# Patient Record
Sex: MALE | Race: BLACK OR AFRICAN AMERICAN | NOT HISPANIC OR LATINO | Employment: PART TIME | URBAN - METROPOLITAN AREA
[De-identification: names, ages, dates, MRNs, and addresses within clinical notes are randomized per-mention and may not be internally consistent; named-entity substitution may affect disease eponyms.]

---
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
## 2020-02-18 DIAGNOSIS — F90.2 ADHD (ATTENTION DEFICIT HYPERACTIVITY DISORDER), COMBINED TYPE: ICD-10-CM

## 2020-02-18 DIAGNOSIS — Z79.899 ENCOUNTER FOR LONG-TERM (CURRENT) USE OF MEDICATIONS: ICD-10-CM

## 2020-02-18 NOTE — TELEPHONE ENCOUNTER
Received request via: Patient    Was the patient seen in the last year in this department? Yes    Does the patient have an active prescription (recently filled or refills available) for medication(s) requested? No     : 12/11/19 60 for 30 (written 11/27/19)

## 2020-02-19 ENCOUNTER — OFFICE VISIT (OUTPATIENT)
Dept: BEHAVIORAL HEALTH | Facility: CLINIC | Age: 20
End: 2020-02-19
Payer: COMMERCIAL

## 2020-02-19 VITALS
WEIGHT: 157 LBS | SYSTOLIC BLOOD PRESSURE: 124 MMHG | DIASTOLIC BLOOD PRESSURE: 81 MMHG | HEIGHT: 70 IN | BODY MASS INDEX: 22.48 KG/M2 | HEART RATE: 71 BPM

## 2020-02-19 DIAGNOSIS — F84.0 AUTISM SPECTRUM DISORDER: ICD-10-CM

## 2020-02-19 DIAGNOSIS — F90.2 ADHD (ATTENTION DEFICIT HYPERACTIVITY DISORDER), COMBINED TYPE: ICD-10-CM

## 2020-02-19 DIAGNOSIS — F51.04 CHRONIC INSOMNIA: ICD-10-CM

## 2020-02-19 DIAGNOSIS — F32.A DEPRESSIVE DISORDER: ICD-10-CM

## 2020-02-19 PROCEDURE — 99214 OFFICE O/P EST MOD 30 MIN: CPT | Performed by: PSYCHIATRY & NEUROLOGY

## 2020-02-19 RX ORDER — DEXTROAMPHETAMINE SACCHARATE, AMPHETAMINE ASPARTATE, DEXTROAMPHETAMINE SULFATE AND AMPHETAMINE SULFATE 2.5; 2.5; 2.5; 2.5 MG/1; MG/1; MG/1; MG/1
10 TABLET ORAL 2 TIMES DAILY
Qty: 60 TAB | Refills: 0 | Status: SHIPPED | OUTPATIENT
Start: 2020-03-30 | End: 2020-09-08 | Stop reason: SDUPTHER

## 2020-02-19 RX ORDER — DEXTROAMPHETAMINE SACCHARATE, AMPHETAMINE ASPARTATE, DEXTROAMPHETAMINE SULFATE AND AMPHETAMINE SULFATE 2.5; 2.5; 2.5; 2.5 MG/1; MG/1; MG/1; MG/1
10 TABLET ORAL 2 TIMES DAILY
Qty: 60 TAB | Refills: 0 | Status: SHIPPED | OUTPATIENT
Start: 2020-02-19 | End: 2020-03-20

## 2020-02-19 RX ORDER — DEXTROAMPHETAMINE SACCHARATE, AMPHETAMINE ASPARTATE, DEXTROAMPHETAMINE SULFATE AND AMPHETAMINE SULFATE 2.5; 2.5; 2.5; 2.5 MG/1; MG/1; MG/1; MG/1
10 TABLET ORAL 2 TIMES DAILY
Qty: 60 TAB | Refills: 0 | OUTPATIENT
Start: 2020-02-19 | End: 2020-03-20

## 2020-02-19 RX ORDER — QUETIAPINE FUMARATE 100 MG/1
100 TABLET, FILM COATED ORAL
Qty: 90 TAB | Refills: 0 | Status: SHIPPED | OUTPATIENT
Start: 2020-02-19 | End: 2020-04-09

## 2020-02-19 ASSESSMENT — PATIENT HEALTH QUESTIONNAIRE - PHQ9: CLINICAL INTERPRETATION OF PHQ2 SCORE: 0

## 2020-02-19 NOTE — PROGRESS NOTES
PSYCHIATRY FOLLOW-UP NOTE      Chief Complaint   Patient presents with   • Follow-Up     ADHD, depression         History Of Present Illness:  Deni Vences is a 19 y.o. old transgender female with autism spectrum disorder, ADHD, depressive disorder comes in today for follow up, was last seen 3 months ago.  She is doing all right in regards to his ADHD symptoms.  She is taking 5 classes this semester and continues to use anywhere from 1 to 2 tablets of Adderall to help her stay focused and concentrated.  She has been using 1 majority of the days and endorses benefit.  She denies any new psychosocial stressors.  She denies any current mood symptoms.  She talked at length about possibly having schizoaffective disorder and mentioned some on and off symptoms including dissociation, delusions and feeling disconnected from the world.  She feels that he might have been incorrectly diagnosed as bipolar mood disorder in the past.  She also feels sometimes she stays things that she does not want to.  She denies any problems with his sleep and continues to use Seroquel which she feels helps him with his psychotic symptoms as well.  She denies having thoughts of wanting to hurt herself or others.    Social History:   She is currently a full-time student at [employer] and is working on getting her bachelor's degree in philosophy and math.  She lives in the college dorms.  Her mother and younger brother live in [place].  She is currently dating a justin and identifies herself as bisexual.    Substance Use:  Alcohol - Denies   Nicotine - Denies recent use  Illicit drugs - Denies    Past Medication Trials:  Prozac 20, Lithium 450 mg, Trileptal 300 mg. Vyvanse 30 mg, Adderall XR 30 mg    Medications:  Current Outpatient Medications   Medication Sig Dispense Refill   • QUEtiapine (SEROQUEL) 100 MG Tab Take 1 Tab by mouth every bedtime. 90 Tab 0   • estradiol (ESTRACE) 2 MG Tab Take 2 mg by mouth every day.  3   • progesterone  "(PROMETRIUM) 100 MG Cap Take 100 mg by mouth every day.  3   • spironolactone (ALDACTONE) 50 MG Tab Take 50 mg by mouth 2 Times a Day.  3     No current facility-administered medications for this visit.        Review Of Systems:    Constitutional - Negative for fatigue  Respiratory - Negative for shortness of breath, cough  CVS - Negative for chest pain, palpitations  GI - Negative for nausea, vomiting, abdominal pain, diarrhea, constipation  Musculoskeletal - Negative for back pain  Neurological - Negative for headaches  Psychiatric - Negative for impaired focus and concentration, depression, sleep problems    Physical Examination:  Vital signs: /81   Pulse 71   Ht 1.778 m (5' 10\")   Wt 71.2 kg (157 lb)   BMI 22.53 kg/m²     Musculoskeletal: Normal gait. No abnormal movements.     Mental Status Evaluation:   General: Young black transgender female, dressed in casual attire, good grooming and hygiene, in no apparent distress, calm and cooperative, poor eye contact, no psychomotor agitation or retardation  Orientation: Alert and oriented to person, place and time  Recent and remote memory: Grossly intact  Attention span and concentration: Grossly intact  Speech: Spontaneous, normal rate, rhythm and tone  Thought Process: Linear, logical and goal directed  Thought Content: Denies suicidal or homicidal ideations, intent or plan  Perception: Denies auditory or visual hallucinations. No delusions noted  Associations: Intact  Language: Appropriate  Fund of knowledge and vocabulary: Grossly adequate  Mood: \"okay\"  Affect: Euthymic, mood congruent  Insight: Good  Judgment: Good    Depression screening:  Depression Screen (PHQ-2/PHQ-9) 10/4/2018 1/18/2019 2/19/2020   PHQ-2 Total Score 0 1 0   PHQ-9 Total Score - 5 -     Interpretation of PHQ-9 Total Score   Score Severity   1-4 No Depression   5-9 Mild Depression   10-14 Moderate Depression   15-19 Moderately Severe Depression   20-27 Severe Depression    Medical " Records/Labs/Diagnostic Tests Reviewed:  [place]  records - appropriate refills, no abuse suspected       Impression:  1.  ADHD, combined type - stable  2.  Depressive disorder, unspecified type - stable  3.  Autism spectrum disorder - stable  4.  Chronic insomnia - stable    Plan:  1.  Continue Adderall IR 10 mg twice daily for ADHD.  Provided her with 2 prescriptions for 60 tablets each and again encouraged her to drop both the prescriptions at the pharmacy at the same time so that they do not get .  2.  Continue Seroquel 100 mg at bedtime for mood stabilization and sleep  - AIMS 0 (today)              - Metabolic monitoring: lipid profile with low total cholesterol (10/2017), A1c normal at 5.4 (3/2018)  - Repeat lipid profile and A1c ordered yesterday, reminded her to get the blood work done as soon as possible  3.  Continue to monitor depression without antidepressant medications at this time  4.  Discussed her concerns and it appears that likely manifestation of her underlying autism spectrum disorder rather than schizoaffective disorder    Return to clinic in 3 months or sooner if symptoms worsen    The proposed treatment plan was discussed with the patient who was provided the opportunity to ask questions and make suggestions regarding alternative treatment. Patient verbalized understanding and expressed agreement with the plan.     Haylee Champagne M.D.  20    This note was created using voice recognition software (Dragon). The accuracy of the dictation is limited by the abilities of the software. I have reviewed the note prior to signing, however some errors in grammar and context are still possible. If you have any questions related to this note please do not hesitate to contact our office.

## 2020-03-12 ENCOUNTER — APPOINTMENT (OUTPATIENT)
Dept: BEHAVIORAL HEALTH | Facility: CLINIC | Age: 20
End: 2020-03-12
Payer: COMMERCIAL

## 2020-04-22 ENCOUNTER — TELEMEDICINE (OUTPATIENT)
Dept: BEHAVIORAL HEALTH | Facility: CLINIC | Age: 20
End: 2020-04-22
Payer: COMMERCIAL

## 2020-04-22 VITALS — WEIGHT: 158 LBS | HEIGHT: 71 IN | BODY MASS INDEX: 22.12 KG/M2

## 2020-04-22 DIAGNOSIS — F84.0 AUTISM SPECTRUM DISORDER: ICD-10-CM

## 2020-04-22 DIAGNOSIS — F32.A DEPRESSIVE DISORDER: ICD-10-CM

## 2020-04-22 DIAGNOSIS — F90.2 ADHD (ATTENTION DEFICIT HYPERACTIVITY DISORDER), COMBINED TYPE: ICD-10-CM

## 2020-04-22 DIAGNOSIS — F51.04 CHRONIC INSOMNIA: ICD-10-CM

## 2020-04-22 PROCEDURE — 99214 OFFICE O/P EST MOD 30 MIN: CPT | Mod: 95,CR | Performed by: PSYCHIATRY & NEUROLOGY

## 2020-04-22 RX ORDER — QUETIAPINE FUMARATE 200 MG/1
200 TABLET, FILM COATED ORAL
Qty: 90 TAB | Refills: 0 | Status: SHIPPED | OUTPATIENT
Start: 2020-04-22 | End: 2020-07-28

## 2020-04-22 NOTE — PROGRESS NOTES
"PSYCHIATRY TELEMEDICINE FOLLOW-UP NOTE      Chief Complaint   Patient presents with   • Other     I feel like my dose of Seroquel is low     This encounter was conducted via Zoom .   Verbal consent was obtained. Patient's identity was verified.    History Of Present Illness:  Deni Vences is a 19 y.o. old transgender female with autism spectrum disorder, ADHD, depressive disorder comes in today for follow up, was last seen 2 months ago.  She has been struggling with some irritability and depression on and off lately.  She is compliant with Seroquel but was looking at the dose online and feels that she is on on the lower dose.  She endorses more \"emotional volatility\" and irritability mainly in social settings.  She had to move back home as the domes were closed because of coronavirus and is currently taking classes online.  She denies any relationship stressors at home and continues to be in touch with her boyfriend.  She has been doing all right in regards to her ADHD symptoms with Adderall.  She denies any problems with her appetite and sleep can sometimes be an issue but feels that Seroquel is helpful.  She denies having thoughts of wanting to hurt herself or others.    Social History:   She is currently a full-time student at [employer] and is getting her bachelor's degree in philosophy and math.  She has moved back home with her mother and younger brother live in [place].  She is currently dating a justin and identifies herself as bisexual.    Substance Use:  Alcohol - Denies   Nicotine - Denies   Illicit drugs - Denies    Past Medication Trials:  Prozac 20, Lithium 450 mg, Trileptal 300 mg, Vyvanse 30 mg, Adderall XR 30 mg    Medications:  Current Outpatient Medications   Medication Sig Dispense Refill   • QUEtiapine (SEROQUEL) 100 MG Tab Take 1 Tab by mouth every bedtime. 90 Tab 0   • amphetamine-dextroamphetamine (ADDERALL) 10 MG Tab Take 1 Tab by mouth 2 times a day for 30 days. 60 Tab 0   • estradiol " "(ESTRACE) 2 MG Tab Take 2 mg by mouth every day.  3   • progesterone (PROMETRIUM) 100 MG Cap Take 100 mg by mouth every day.  3   • spironolactone (ALDACTONE) 50 MG Tab Take 50 mg by mouth 2 Times a Day.  3     No current facility-administered medications for this visit.      Review Of Systems:    Constitutional - Negative for fatigue  Respiratory - Negative for shortness of breath, cough  CVS - Negative for chest pain, palpitations  GI - Negative for nausea, vomiting, abdominal pain, diarrhea, constipation  Musculoskeletal - Negative for back pain  Neurological - Negative for headaches  Psychiatric - Positive for depression, irritability     Physical Examination:  Vital signs: Ht 1.803 m (5' 11\")   Wt 71.7 kg (158 lb)   BMI 22.04 kg/m²     Musculoskeletal: No abnormal movements.     Mental Status Evaluation:   General: Young black transgender female, dressed in casual attire, good grooming and hygiene, in no apparent distress, calm and cooperative, poor eye contact, no psychomotor agitation or retardation  Orientation: Alert and oriented to person, place and time  Recent and remote memory: Grossly intact  Attention span and concentration: Grossly intact  Speech: Spontaneous, normal rate, rhythm and tone  Thought Process: Linear, logical and goal directed  Thought Content: Denies suicidal or homicidal ideations, intent or plan  Perception: Denies auditory or visual hallucinations. No delusions noted  Associations: Intact  Language: Appropriate  Fund of knowledge and vocabulary: Grossly adequate  Mood: \"fine\"  Affect: Euthymic, mood congruent  Insight: Good  Judgment: Good    Depression screening:  Depression Screen (PHQ-2/PHQ-9) 10/4/2018 1/18/2019 2/19/2020   PHQ-2 Total Score 0 1 0   PHQ-9 Total Score - 5 -     Interpretation of PHQ-9 Total Score   Score Severity   1-4 No Depression   5-9 Mild Depression   10-14 Moderate Depression   15-19 Moderately Severe Depression   20-27 Severe Depression    Medical " Records/Labs/Diagnostic Tests Reviewed:  [place]  records - appropriate refills, no abuse suspected       Impression:  1.  ADHD, combined type - stable  2.  Depressive disorder, unspecified type - worsening   3.  Autism spectrum disorder - stable  4.  Chronic insomnia - stable    Plan:  1.  Continue Adderall IR 10 mg twice daily for ADHD  2.  Increase Seroquel to 200 mg at bedtime for mood stabilization, depression and sleep  - AIMS 0 (2/2020)              - Metabolic monitoring: lipid profile with low total cholesterol (10/2017), A1c normal at 5.4 (3/2018)  - Repeat lipid profile and A1c ordered at last appointment, reminded him to get the labs done prior to his next appointment  3.  Continue to monitor depression without SSRI/SNRI medications at this time, Seroquel has been beneficial for mood symptoms.    Return to clinic in 6 weeks or sooner if symptoms worsen    The proposed treatment plan was discussed with the patient who was provided the opportunity to ask questions and make suggestions regarding alternative treatment. Patient verbalized understanding and expressed agreement with the plan.     Haylee Champagne M.D.  04/22/20    This note was created using voice recognition software (Dragon). The accuracy of the dictation is limited by the abilities of the software. I have reviewed the note prior to signing, however some errors in grammar and context are still possible. If you have any questions related to this note please do not hesitate to contact our office.

## 2020-06-03 ENCOUNTER — TELEMEDICINE (OUTPATIENT)
Dept: BEHAVIORAL HEALTH | Facility: CLINIC | Age: 20
End: 2020-06-03
Payer: COMMERCIAL

## 2020-06-03 VITALS — HEIGHT: 71 IN | BODY MASS INDEX: 23.38 KG/M2 | WEIGHT: 167 LBS

## 2020-06-03 DIAGNOSIS — F51.04 CHRONIC INSOMNIA: ICD-10-CM

## 2020-06-03 DIAGNOSIS — F32.A DEPRESSIVE DISORDER: ICD-10-CM

## 2020-06-03 DIAGNOSIS — F84.0 AUTISM SPECTRUM DISORDER: ICD-10-CM

## 2020-06-03 DIAGNOSIS — F90.2 ADHD (ATTENTION DEFICIT HYPERACTIVITY DISORDER), COMBINED TYPE: ICD-10-CM

## 2020-06-03 PROCEDURE — 99214 OFFICE O/P EST MOD 30 MIN: CPT | Mod: 95,CR | Performed by: PSYCHIATRY & NEUROLOGY

## 2020-06-03 NOTE — PROGRESS NOTES
PSYCHIATRY TELEMEDICINE FOLLOW-UP NOTE      Chief Complaint   Patient presents with   • Follow-Up     depression, ADHD       This encounter was conducted via Zoom .   Verbal consent was obtained. Patient's identity was verified.    History Of Present Illness:  Deni Vences is a 20 y.o. old transgender female with autism spectrum disorder, ADHD, depressive disorder comes in today for follow up, was last seen 6 weeks ago.  She has been doing better in regards to his mood symptoms with the higher dose of Seroquel.  She is having some stressors at home as her mother has asked her to move out in the near future.  She plans on moving to [place] by next year and does not want him to move along.  She is partially supportive in regards to her transgender status.  Her father is not supportive as well.  She is currently in a relationship and has received support from her boyfriend and plans on moving in with him and his family this summer.  She denies any reckless or impulsive behaviors.  She has been doing all right in regards to his sleep and has gained some weight.  She has been doing all right in regards to his ADHD symptoms as well with Adderall.  She normally takes less Adderall when she is out of school.  She denies having thoughts wanting to hurt himself or others.    Social History:   She is currently a full-time student at [employer] and is getting her bachelor's degree in philosophy and math.  She has moved back home with her mother and younger brother live in [place].  She identifies herself as bisexual and is currently in a relationship with her boyfriend.    Substance Use:  Alcohol - Denies   Nicotine - Denies   Illicit drugs - Denies    Past Medication Trials:  Prozac 20, Lithium 450 mg, Trileptal 300 mg, Vyvanse 30 mg, Adderall XR 30 mg    Medications:  Current Outpatient Medications   Medication Sig Dispense Refill   • QUEtiapine (SEROQUEL) 200 MG Tab Take 1 Tab by mouth every bedtime. 90 Tab 0   •  "estradiol (ESTRACE) 2 MG Tab Take 2 mg by mouth every day.  3   • progesterone (PROMETRIUM) 100 MG Cap Take 100 mg by mouth every day.  3   • spironolactone (ALDACTONE) 50 MG Tab Take 50 mg by mouth 2 Times a Day.  3     No current facility-administered medications for this visit.      Review Of Systems:    Constitutional - Negative for fatigue  Respiratory - Negative for shortness of breath, cough  CVS - Negative for chest pain, palpitations  GI - Negative for nausea, vomiting, abdominal pain, diarrhea, constipation  Musculoskeletal - Negative for back pain  Neurological - Negative for headaches  Psychiatric - Positive for depression    Physical Examination:  Vital signs: Ht 1.803 m (5' 11\")   Wt 75.8 kg (167 lb)   BMI 23.29 kg/m²     Musculoskeletal: No abnormal movements.     Mental Status Evaluation:   General: Young black transgender female, dressed in casual attire, good grooming and hygiene, in no apparent distress, calm and cooperative, poor eye contact, no psychomotor agitation or retardation  Orientation: Alert and oriented to person, place and time  Recent and remote memory: Grossly intact  Attention span and concentration: Grossly intact  Speech: Spontaneous, normal rate, rhythm and tone  Thought Process: Linear, logical and goal directed  Thought Content: Denies suicidal or homicidal ideations, intent or plan  Perception: Denies auditory or visual hallucinations. No delusions noted  Associations: Intact  Language: Appropriate  Fund of knowledge and vocabulary: Grossly adequate  Mood: \"fine\"  Affect: Euthymic, mood congruent  Insight: Good  Judgment: Good    Depression screening:  Depression Screen (PHQ-2/PHQ-9) 10/4/2018 1/18/2019 2/19/2020   PHQ-2 Total Score 0 1 0   PHQ-9 Total Score - 5 -     Interpretation of PHQ-9 Total Score   Score Severity   1-4 No Depression   5-9 Mild Depression   10-14 Moderate Depression   15-19 Moderately Severe Depression   20-27 Severe Depression    Medical " Records/Labs/Diagnostic Tests Reviewed:  [place]  records - appropriate refills, no abuse suspected       Impression:  1.  ADHD, combined type - stable  2.  Depressive disorder, unspecified type - improving   3.  Autism spectrum disorder - stable  4.  Chronic insomnia - stable    Plan:  1.  Continue Adderall IR 10 mg twice daily for ADHD.  Not refilled today.  He should have a prescription on hold at the pharmacy and was advised to pick it up and to contact me through [facility] if he needs a refill before his next appointment.  2.  Continue Seroquel 200 mg at bedtime for mood stabilization, depression and sleep  - AIMS 0 (2/2020)              - Metabolic monitoring: lipid profile with low total cholesterol (10/2017), A1c normal at 5.4 (3/2018)  - Repeat lipid profile and A1c ordered previously, reminded him to get the labs done in order for me to continue prescribing Seroquel   3.  Continue to monitor depression without SSRI/SNRI medications at this time, Seroquel has been beneficial for mood symptoms.    Return to clinic in 3 months or sooner if symptoms worsen    The proposed treatment plan was discussed with the patient who was provided the opportunity to ask questions and make suggestions regarding alternative treatment. Patient verbalized understanding and expressed agreement with the plan.     Haylee Champagne M.D.  06/03/20    This note was created using voice recognition software (Dragon). The accuracy of the dictation is limited by the abilities of the software. I have reviewed the note prior to signing, however some errors in grammar and context are still possible. If you have any questions related to this note please do not hesitate to contact our office.

## 2020-06-12 ENCOUNTER — TELEPHONE (OUTPATIENT)
Dept: PEDIATRICS | Facility: PHYSICIAN GROUP | Age: 20
End: 2020-06-12

## 2020-06-12 NOTE — TELEPHONE ENCOUNTER
From: Deni Vences  To: Office of JUAN DAVID Tobias  Sent: 6/12/2020 12:48 AM PDT  Subject: Medication Renewal Request    Deni Vences would like a refill of the following medications:   Other - Adderall, 10MG Tabs    Preferred pharmacy: [id number] - [place], [place] - [address] AT [facility]

## 2020-06-15 NOTE — TELEPHONE ENCOUNTER
I called and left message letting her know to call her psychiatrist for the refill and that we will be removing Joselin as her PCP as she is now 20 years old, and if she has any questions to give me a call back.

## 2020-06-15 NOTE — TELEPHONE ENCOUNTER
I have not seen Sylmar is over 2 years, he is being followed by psychiatry for this so they have to reach out to them. Also, please update the PCP and remove my name from chart. thanks

## 2020-07-28 RX ORDER — QUETIAPINE FUMARATE 200 MG/1
TABLET, FILM COATED ORAL
Qty: 90 TAB | Refills: 0 | Status: SHIPPED | OUTPATIENT
Start: 2020-07-28 | End: 2020-11-02 | Stop reason: SDUPTHER

## 2020-09-08 DIAGNOSIS — F90.2 ADHD (ATTENTION DEFICIT HYPERACTIVITY DISORDER), COMBINED TYPE: ICD-10-CM

## 2020-09-08 RX ORDER — DEXTROAMPHETAMINE SACCHARATE, AMPHETAMINE ASPARTATE, DEXTROAMPHETAMINE SULFATE AND AMPHETAMINE SULFATE 2.5; 2.5; 2.5; 2.5 MG/1; MG/1; MG/1; MG/1
10 TABLET ORAL 2 TIMES DAILY
Qty: 60 TAB | Refills: 0 | Status: SHIPPED | OUTPATIENT
Start: 2020-09-08 | End: 2020-10-08

## 2020-11-02 ENCOUNTER — TELEPHONE (OUTPATIENT)
Dept: PEDIATRICS | Facility: PHYSICIAN GROUP | Age: 20
End: 2020-11-02

## 2020-11-02 RX ORDER — QUETIAPINE FUMARATE 200 MG/1
200 TABLET, FILM COATED ORAL
Qty: 30 TAB | Refills: 0 | Status: SHIPPED | OUTPATIENT
Start: 2020-11-02 | End: 2020-11-19 | Stop reason: SDUPTHER

## 2020-11-02 NOTE — TELEPHONE ENCOUNTER
Please forward this message/medication request to the right PCP. She is 20 years old and has been following up with a new PCP for quiet sometime. Please remove me as PCP. Thanks.

## 2020-11-09 ENCOUNTER — HOSPITAL ENCOUNTER (OUTPATIENT)
Dept: LAB | Facility: MEDICAL CENTER | Age: 20
End: 2020-11-09
Attending: PSYCHIATRY & NEUROLOGY
Payer: COMMERCIAL

## 2020-11-09 DIAGNOSIS — Z79.899 ENCOUNTER FOR LONG-TERM (CURRENT) USE OF MEDICATIONS: ICD-10-CM

## 2020-11-09 LAB
CHOLEST SERPL-MCNC: 128 MG/DL (ref 100–199)
EST. AVERAGE GLUCOSE BLD GHB EST-MCNC: 111 MG/DL
HBA1C MFR BLD: 5.5 % (ref 0–5.6)
HDLC SERPL-MCNC: 70 MG/DL
LDLC SERPL CALC-MCNC: 44 MG/DL
TRIGL SERPL-MCNC: 70 MG/DL (ref 0–149)

## 2020-11-09 PROCEDURE — 36415 COLL VENOUS BLD VENIPUNCTURE: CPT

## 2020-11-09 PROCEDURE — 83036 HEMOGLOBIN GLYCOSYLATED A1C: CPT

## 2020-11-09 PROCEDURE — 80061 LIPID PANEL: CPT

## 2020-11-19 ENCOUNTER — TELEMEDICINE (OUTPATIENT)
Dept: BEHAVIORAL HEALTH | Facility: CLINIC | Age: 20
End: 2020-11-19
Payer: COMMERCIAL

## 2020-11-19 VITALS — WEIGHT: 176 LBS | HEIGHT: 71 IN | BODY MASS INDEX: 24.64 KG/M2

## 2020-11-19 DIAGNOSIS — F51.04 CHRONIC INSOMNIA: ICD-10-CM

## 2020-11-19 DIAGNOSIS — F84.0 AUTISM SPECTRUM DISORDER: ICD-10-CM

## 2020-11-19 DIAGNOSIS — F32.A DEPRESSIVE DISORDER: ICD-10-CM

## 2020-11-19 DIAGNOSIS — F90.2 ADHD (ATTENTION DEFICIT HYPERACTIVITY DISORDER), COMBINED TYPE: ICD-10-CM

## 2020-11-19 PROCEDURE — 99214 OFFICE O/P EST MOD 30 MIN: CPT | Mod: 95,CR | Performed by: PSYCHIATRY & NEUROLOGY

## 2020-11-19 RX ORDER — DEXTROAMPHETAMINE SACCHARATE, AMPHETAMINE ASPARTATE, DEXTROAMPHETAMINE SULFATE AND AMPHETAMINE SULFATE 2.5; 2.5; 2.5; 2.5 MG/1; MG/1; MG/1; MG/1
10 TABLET ORAL EVERY MORNING
Qty: 30 TAB | Refills: 0 | Status: SHIPPED | OUTPATIENT
Start: 2020-12-18 | End: 2020-12-09 | Stop reason: DRUGHIGH

## 2020-11-19 RX ORDER — DEXTROAMPHETAMINE SACCHARATE, AMPHETAMINE ASPARTATE, DEXTROAMPHETAMINE SULFATE AND AMPHETAMINE SULFATE 2.5; 2.5; 2.5; 2.5 MG/1; MG/1; MG/1; MG/1
10 TABLET ORAL EVERY MORNING
Qty: 30 TAB | Refills: 0 | Status: SHIPPED | OUTPATIENT
Start: 2021-01-16 | End: 2020-12-09 | Stop reason: DRUGHIGH

## 2020-11-19 RX ORDER — QUETIAPINE FUMARATE 200 MG/1
200 TABLET, FILM COATED ORAL
Qty: 90 TAB | Refills: 0 | Status: SHIPPED | OUTPATIENT
Start: 2020-11-19 | End: 2021-03-01 | Stop reason: SDUPTHER

## 2020-11-19 RX ORDER — ESTRADIOL VALERATE 20 MG/ML
INJECTION INTRAMUSCULAR
COMMUNITY
Start: 2020-11-03 | End: 2021-03-30

## 2020-11-19 RX ORDER — DEXTROAMPHETAMINE SACCHARATE, AMPHETAMINE ASPARTATE, DEXTROAMPHETAMINE SULFATE AND AMPHETAMINE SULFATE 2.5; 2.5; 2.5; 2.5 MG/1; MG/1; MG/1; MG/1
10 TABLET ORAL EVERY MORNING
Qty: 30 TAB | Refills: 0 | Status: SHIPPED | OUTPATIENT
Start: 2020-11-19 | End: 2020-12-09 | Stop reason: DRUGHIGH

## 2020-11-19 NOTE — PROGRESS NOTES
PSYCHIATRY TELEMEDICINE FOLLOW-UP NOTE      Chief Complaint   Patient presents with   • Follow-Up     ADHD, depression       This evaluation was conducted via Zoom using secure and encrypted videoconferencing technology. The patient was in a private location in the state of [place].    The patient's identity was confirmed and verbal consent was obtained for this virtual visit.    History Of Present Illness:  Deni Vences is a 20 y.o. transgender female with autism spectrum disorder, ADHD, depressive disorder comes in today for follow up, was last seen over 5 months ago.  She has been doing all right in regards to her mental health since his last visit with me.  She broke up with her boyfriend and is doing all right with that decision.  She she is still at home as her mother decided that she is not moving to [place] anymore.  She is planning on getting a vocal cord surgery to start her transition process and has found a surgeon in [place] who can do the surgery.  She is currently on her mother's insurance and she does not think that her mother will let her do any kind of her any transition surgery.  She is thinking about applying for Medicaid to see if she can get it approved through Medicaid.  Her father lives in [place] and even though he is not supportive she might be able to live with him if the surgery is approved and she has to travel there.  She is doing all right in school and is applying for grad schools at [employer] and outside [place].  She has been doing all right in regards to her sleep and appetite.  She continues to use Seroquel on a consistent basis and had a 2 night period where she did not take Seroquel and had a hard time with sleeping.  He denies any reckless or impulsive behaviors during those 2 nights.  She has also been working part-time and it has been going well for her.  She has been taking Adderall only once daily and very infrequently she takes the second dose.  She is doing all right with the  "once daily dosing and is denying any side effects.  She denies having thoughts wanting to hurt herself or others.    Social History:   She is single, recently broke up boyfriend, identifies as bisexual, lives with mother and younger brother in [place], father lives in [place], full-time student at [employer], getting her bachelor's degree in philosophy and math, working part-time at a [employer] place in [place].    Substance Use:  Alcohol - Denies   Nicotine - Denies   Cannabis - Denies  Illicit drugs - Denies    Past Medication Trials:  Prozac 20 mg, Lithium 450 mg, Trileptal 300 mg, Vyvanse 30 mg, Adderall XR 30 mg    Medications:  Current Outpatient Medications   Medication Sig Dispense Refill   • Estradiol Valerate 20 MG/ML Oil ADM 0.5 ML IM 1 TIME A WK     • QUEtiapine (SEROQUEL) 200 MG Tab Take 1 Tab by mouth every bedtime. 30 Tab 0   • estradiol (ESTRACE) 2 MG Tab Take 2 mg by mouth every day.  3   • progesterone (PROMETRIUM) 100 MG Cap Take 100 mg by mouth every day.  3   • spironolactone (ALDACTONE) 50 MG Tab Take 50 mg by mouth 2 Times a Day.  3     No current facility-administered medications for this visit.      Review Of Systems:    Constitutional - Negative for fatigue  Respiratory - Negative for shortness of breath, cough  CVS - Negative for chest pain, palpitations  GI - Negative for nausea, vomiting, abdominal pain, diarrhea, constipation  Musculoskeletal - Negative for back pain  Neurological - Negative for headaches  Psychiatric - Negative for depression, impaired concentration, sleep problems    Physical Examination:  Vital signs: Ht 1.803 m (5' 11\")   Wt 79.8 kg (176 lb)   BMI 24.55 kg/m²     Musculoskeletal: No abnormal movements.     Mental Status Evaluation:   General: Young black female, dressed in casual attire, good grooming and hygiene, in no apparent distress, calm and cooperative, poor eye contact, no psychomotor agitation or retardation  Orientation: Alert and oriented to " "person, place and time  Recent and remote memory: Grossly intact  Attention span and concentration: Grossly intact  Speech: Spontaneous, normal rate, rhythm and tone  Thought Process: Linear, logical and goal directed  Thought Content: Denies suicidal or homicidal ideations, intent or plan  Perception: Denies auditory or visual hallucinations. No delusions noted  Associations: Intact  Language: Appropriate  Fund of knowledge and vocabulary: Grossly adequate  Mood: \"alright\"  Affect: Euthymic, mood congruent  Insight: Good  Judgment: Good    Depression screening:  Depression Screen (PHQ-2/PHQ-9) 10/4/2018 1/18/2019 2/19/2020   PHQ-2 Total Score 0 1 0   PHQ-9 Total Score - 5 -     Interpretation of PHQ-9 Total Score   Score Severity   1-4 No Depression   5-9 Mild Depression   10-14 Moderate Depression   15-19 Moderately Severe Depression   20-27 Severe Depression    Medical Records/Labs/Diagnostic Tests Reviewed:  [place] [facility] records - appropriate refills, no abuse suspected       Impression:  1.  ADHD, combined type - stable  2.  Depressive disorder, unspecified type - stable  3.  Autism spectrum disorder - stable  4.  Chronic insomnia - stable    Plan:  1.  Decrease Adderall IR 10 mg to daily for ADHD.  E-prescribed x 3 months.  2.  Continue Seroquel 200 mg at bedtime for mood stabilization, depression and sleep  - AIMS 0 (2/2020)              - Metabolic monitoring (11/2020): lipid profile normal, A1c normal  - Repeat metabolic monitoring labs due in 11/2021    Return to clinic in 3 months or sooner if symptoms worsen    The proposed treatment plan was discussed with the patient who was provided the opportunity to ask questions and make suggestions regarding alternative treatment. Patient verbalized understanding and expressed agreement with the plan.     Haylee Champagne M.D.  11/19/20    This note was created using voice recognition software (Dragon). The accuracy of the dictation is limited by the abilities of the " software. I have reviewed the note prior to signing, however some errors in grammar and context are still possible. If you have any questions related to this note please do not hesitate to contact our office.

## 2020-12-09 DIAGNOSIS — F90.2 ADHD (ATTENTION DEFICIT HYPERACTIVITY DISORDER), COMBINED TYPE: ICD-10-CM

## 2020-12-09 RX ORDER — DEXTROAMPHETAMINE SACCHARATE, AMPHETAMINE ASPARTATE, DEXTROAMPHETAMINE SULFATE AND AMPHETAMINE SULFATE 2.5; 2.5; 2.5; 2.5 MG/1; MG/1; MG/1; MG/1
10 TABLET ORAL 2 TIMES DAILY
Qty: 60 TAB | Refills: 0 | Status: SHIPPED | OUTPATIENT
Start: 2021-01-08 | End: 2020-12-15 | Stop reason: SDUPTHER

## 2020-12-09 RX ORDER — DEXTROAMPHETAMINE SACCHARATE, AMPHETAMINE ASPARTATE, DEXTROAMPHETAMINE SULFATE AND AMPHETAMINE SULFATE 2.5; 2.5; 2.5; 2.5 MG/1; MG/1; MG/1; MG/1
10 TABLET ORAL 2 TIMES DAILY
Qty: 60 TAB | Refills: 0 | Status: SHIPPED | OUTPATIENT
Start: 2020-12-09 | End: 2020-12-15 | Stop reason: SDUPTHER

## 2020-12-15 DIAGNOSIS — F90.2 ADHD (ATTENTION DEFICIT HYPERACTIVITY DISORDER), COMBINED TYPE: ICD-10-CM

## 2020-12-15 RX ORDER — DEXTROAMPHETAMINE SACCHARATE, AMPHETAMINE ASPARTATE, DEXTROAMPHETAMINE SULFATE AND AMPHETAMINE SULFATE 2.5; 2.5; 2.5; 2.5 MG/1; MG/1; MG/1; MG/1
10 TABLET ORAL 2 TIMES DAILY
Qty: 60 TAB | Refills: 0 | Status: SHIPPED | OUTPATIENT
Start: 2020-12-15 | End: 2021-01-14

## 2020-12-15 RX ORDER — DEXTROAMPHETAMINE SACCHARATE, AMPHETAMINE ASPARTATE, DEXTROAMPHETAMINE SULFATE AND AMPHETAMINE SULFATE 2.5; 2.5; 2.5; 2.5 MG/1; MG/1; MG/1; MG/1
10 TABLET ORAL 2 TIMES DAILY
Qty: 60 TAB | Refills: 0 | Status: SHIPPED | OUTPATIENT
Start: 2021-02-13 | End: 2021-03-22 | Stop reason: SDUPTHER

## 2021-01-18 ENCOUNTER — TELEPHONE (OUTPATIENT)
Dept: SCHEDULING | Facility: IMAGING CENTER | Age: 21
End: 2021-01-18

## 2021-01-19 ENCOUNTER — OFFICE VISIT (OUTPATIENT)
Dept: MEDICAL GROUP | Facility: PHYSICIAN GROUP | Age: 21
End: 2021-01-19
Payer: COMMERCIAL

## 2021-01-19 VITALS
DIASTOLIC BLOOD PRESSURE: 70 MMHG | WEIGHT: 183 LBS | HEART RATE: 85 BPM | HEIGHT: 71 IN | TEMPERATURE: 97.3 F | SYSTOLIC BLOOD PRESSURE: 102 MMHG | OXYGEN SATURATION: 96 % | BODY MASS INDEX: 25.62 KG/M2

## 2021-01-19 DIAGNOSIS — F90.2 ADHD (ATTENTION DEFICIT HYPERACTIVITY DISORDER), COMBINED TYPE: ICD-10-CM

## 2021-01-19 DIAGNOSIS — Z11.3 SCREENING FOR STD (SEXUALLY TRANSMITTED DISEASE): ICD-10-CM

## 2021-01-19 DIAGNOSIS — Z23 NEED FOR VACCINATION: ICD-10-CM

## 2021-01-19 DIAGNOSIS — Z78.9 MALE-TO-FEMALE TRANSGENDER PERSON: ICD-10-CM

## 2021-01-19 DIAGNOSIS — F84.0 AUTISM SPECTRUM DISORDER: ICD-10-CM

## 2021-01-19 DIAGNOSIS — F32.A DEPRESSIVE DISORDER: ICD-10-CM

## 2021-01-19 PROCEDURE — 90472 IMMUNIZATION ADMIN EACH ADD: CPT | Performed by: FAMILY MEDICINE

## 2021-01-19 PROCEDURE — 90651 9VHPV VACCINE 2/3 DOSE IM: CPT | Performed by: FAMILY MEDICINE

## 2021-01-19 PROCEDURE — 90621 MENB-FHBP VACC 2/3 DOSE IM: CPT | Performed by: FAMILY MEDICINE

## 2021-01-19 PROCEDURE — 90686 IIV4 VACC NO PRSV 0.5 ML IM: CPT | Performed by: FAMILY MEDICINE

## 2021-01-19 PROCEDURE — 99385 PREV VISIT NEW AGE 18-39: CPT | Mod: 25 | Performed by: FAMILY MEDICINE

## 2021-01-19 PROCEDURE — 90715 TDAP VACCINE 7 YRS/> IM: CPT | Performed by: FAMILY MEDICINE

## 2021-01-19 PROCEDURE — 90471 IMMUNIZATION ADMIN: CPT | Performed by: FAMILY MEDICINE

## 2021-01-19 NOTE — PROGRESS NOTES
CC:Diagnoses of ADHD (attention deficit hyperactivity disorder), combined type, Autism spectrum disorder, Depressive disorder, Male-to-female transgender person, Screening for STD (sexually transmitted disease), and Need for vaccination were pertinent to this visit.    Deni Vences is a 20 y.o. adult presents for a routine preventive health exam.    Ob-Gyn/ History:    Male to female transgender no gender affirming surgery to date.    Screening/Preventative Topics:  Advanced directive: Not applicable  Osteoporosis Screen/ DEXA: n/a   Diabetes Screening: Up-to-date and normal  AAA Screening: n/a  Aspirin Use: Not indicated  Diet: Suboptimal  Exercise: Nonexistent  Screen for depression: PHQ-2: 10 patient currently follows with psychiatry and is obtaining referral to therapeutic services today.  Substance Abuse: None    Cancer screening  Colorectal Cancer Screening: Not indicated to date  Lung Cancer Screening: Not indicated    Infectious disease screening  --STI Screening: HIV, RPR, GC chlamydia ordered today  --Practices safe sex.  --HIV Screening: Ordered  --Syphilis Screening: Ordered  --Hepatitis C Screening: Not indicated    Patient Active Problem List    Diagnosis Date Noted   • Male-to-female transgender person 07/24/2019   • Chronic insomnia 04/18/2019   • Autism spectrum disorder 09/30/2016   • ADHD (attention deficit hyperactivity disorder), combined type 09/30/2016   • Depressive disorder 05/04/2016   • Adolescent scoliosis 05/04/2016      Allergies:Tree nuts food allergy    Current Outpatient Medications   Medication Sig Dispense Refill   • [START ON 2/13/2021] amphetamine-dextroamphetamine (ADDERALL) 10 MG Tab Take 1 Tab by mouth 2 times a day for 30 days. 60 Tab 0   • Estradiol Valerate 20 MG/ML Oil ADM 0.5 ML IM 1 TIME A WK     • QUEtiapine (SEROQUEL) 200 MG Tab Take 1 Tab by mouth every bedtime. 90 Tab 0   • estradiol (ESTRACE) 2 MG Tab Take 2 mg by mouth every day.  3   • progesterone  "(PROMETRIUM) 100 MG Cap Take 100 mg by mouth every day.  3   • spironolactone (ALDACTONE) 50 MG Tab Take 50 mg by mouth 2 Times a Day.  3     No current facility-administered medications for this visit.        Social History     Tobacco Use   • Smoking status: Never Smoker   • Smokeless tobacco: Never Used   Substance Use Topics   • Alcohol use: No   • Drug use: No     Social History     Social History Narrative   • Not on file       Family History   Problem Relation Age of Onset   • Alcohol abuse Paternal Grandmother    • Depression Brother    • ADD / ADHD Brother        Review of Systems:    Constitutional: No Fevers, Chills  Psych: No Suicidal ideations    All remaining systems reviewed and found to be negative, except as stated above.    Exam:    /70 (BP Location: Right arm, Patient Position: Sitting, BP Cuff Size: Adult)   Pulse 85   Temp 36.3 °C (97.3 °F) (Temporal)   Ht 1.803 m (5' 10.98\")   Wt 83 kg (183 lb)   SpO2 96%  Body mass index is 25.53 kg/m².    General:  Well nourished, well developed adult in NAD  HENT: Atraumatic, normocephalic  EYES: Extraocular movements intact, pupils equal and reactive to light  NECK: Supple, FROM  CHEST: No deformities, Equal chest expansion  RESP: Unlabored, no stridor or audible wheeze  ABD: Soft, Nontender, Non-Distended  Extremities: No Clubbing, Cyanosis, or Edema  Skin: Warm/dry, without rashes  Neuro: A/O x 4, CN 2-12 Grossly intact, Motor/sensory grossly intact  Psych: Normal behavior, normal affect    LABS: 3/2/2018 and 11/9/2020: Results reviewed and discussed with the patient, questions answered.    Records requested from [facility]      Assessment/Plan:  1. ADHD (attention deficit hyperactivity disorder), combined type  2. Autism spectrum disorder  New problem to examiner.  Currently stable and controlled with Adderall.  Patient sees psychiatry Dr. Champagne.  - REFERRAL TO BEHAVIORAL HEALTH    3. Depressive disorder  New problem to examiner.  " Currently mildly exacerbated.  Currently follows with psychiatry but is requesting referral to behavioral health today.  Referral as below.  - REFERRAL TO BEHAVIORAL HEALTH    4. Male-to-female transgender person  New problem to examiner.  Currently followed with endocrinology at [facility].  Records requested.  Awaiting vocal cord surgery for gender affirmation    5. Screening for STD (sexually transmitted disease)  - HIV AG/AB COMBO ASSAY SCREENING; Future  - RPR (SYPHILIS); Future  - HSV 1/2 By PCR(Herpes)+[id number]; Future  - CHLAMYDIA/GC PCR URINE OR SWAB; Future    6. Need for vaccination  - 9VHPV Vaccine 2-3 Dose (GARDASIL 9)  - Meningococcal Vaccine Serogroup B 2-3 Dose (TRUMENBA)  - Influenza Vaccine Quad Injection (PF)  - Tdap Vaccine =>6YO IM      Patient up-to-date on preventative health maintenance examinations and vaccinations.  Age-appropriate anticipatory guidance provided today.    Preventive visit in 1 year, sooner as needed for any concerns.     Please note that this dictation was created using voice recognition software. I have worked with consultants from the vendor as well as technical experts from [facility] to optimize the interface. I have made every reasonable attempt to correct obvious errors, but I expect that there are errors of grammar and possibly content that I did not discover before finalizing the note.

## 2021-01-19 NOTE — LETTER
[facility]  Leroy Khalil M.D.  [address] [place] [address]  Fax: [phone]   Authorization for Release/Disclosure of   Protected Health Information   Name: DENI VENCES : 2000 SSN: [id number]   Address: [address] [place] [address] Phone:    [phone] (home)    I authorize the entity listed below to release/disclose the PHI below to:   [facility]/Leroy Khalil M.D. and Leroy Khalil M.D.   Provider or Entity Name:  [facility] [place] [facility]   Address   City, State, Zip   Phone:      Fax:     Reason for request: continuity of care   Information to be released:    [  ] LAST COLONOSCOPY,  including any PATH REPORT and follow-up  [  ] LAST FIT/COLOGUARD RESULT [  ] LAST DEXA  [  ] LAST MAMMOGRAM  [  ] LAST PAP  [  ] LAST LABS [  ] RETINA EXAM REPORT  [  ] IMMUNIZATION RECORDS  [  ] Release all info      [  ] Check here and initial the line next to each item to release ALL health information INCLUDING  _____ Care and treatment for drug and / or alcohol abuse  _____ HIV testing, infection status, or AIDS  _____ Genetic Testing    DATES OF SERVICE OR TIME PERIOD TO BE DISCLOSED: _____________  I understand and acknowledge that:  * This Authorization may be revoked at any time by you in writing, except if your health information has already been used or disclosed.  * Your health information that will be used or disclosed as a result of you signing this authorization could be re-disclosed by the recipient. If this occurs, your re-disclosed health information may no longer be protected by State or Federal laws.  * You may refuse to sign this Authorization. Your refusal will not affect your ability to obtain treatment.  * This Authorization becomes effective upon signing and will  on (date) __________.      If no date is indicated, this Authorization will  one (1) year from the signature date.    Name: Deni Vences    Signature:   Date:     2021       PLEASE  FAX REQUESTED RECORDS BACK TO: [phone]

## 2021-03-01 RX ORDER — QUETIAPINE FUMARATE 200 MG/1
200 TABLET, FILM COATED ORAL
Qty: 30 TABLET | Refills: 0 | Status: SHIPPED | OUTPATIENT
Start: 2021-03-01 | End: 2021-03-30 | Stop reason: SDUPTHER

## 2021-03-22 DIAGNOSIS — F90.2 ADHD (ATTENTION DEFICIT HYPERACTIVITY DISORDER), COMBINED TYPE: ICD-10-CM

## 2021-03-22 RX ORDER — DEXTROAMPHETAMINE SACCHARATE, AMPHETAMINE ASPARTATE, DEXTROAMPHETAMINE SULFATE AND AMPHETAMINE SULFATE 2.5; 2.5; 2.5; 2.5 MG/1; MG/1; MG/1; MG/1
10 TABLET ORAL 2 TIMES DAILY
Qty: 20 TABLET | Refills: 0 | Status: SHIPPED | OUTPATIENT
Start: 2021-03-22 | End: 2021-04-01

## 2021-03-30 ENCOUNTER — TELEMEDICINE (OUTPATIENT)
Dept: BEHAVIORAL HEALTH | Facility: CLINIC | Age: 21
End: 2021-03-30

## 2021-03-30 VITALS — WEIGHT: 180 LBS | BODY MASS INDEX: 25.77 KG/M2 | HEIGHT: 70 IN

## 2021-03-30 DIAGNOSIS — F51.04 CHRONIC INSOMNIA: ICD-10-CM

## 2021-03-30 DIAGNOSIS — F32.A DEPRESSIVE DISORDER: ICD-10-CM

## 2021-03-30 DIAGNOSIS — F90.2 ADHD (ATTENTION DEFICIT HYPERACTIVITY DISORDER), COMBINED TYPE: ICD-10-CM

## 2021-03-30 DIAGNOSIS — F84.0 AUTISM SPECTRUM DISORDER: ICD-10-CM

## 2021-03-30 PROCEDURE — 99214 OFFICE O/P EST MOD 30 MIN: CPT | Mod: 95,CR | Performed by: PSYCHIATRY & NEUROLOGY

## 2021-03-30 PROCEDURE — 90833 PSYTX W PT W E/M 30 MIN: CPT | Mod: 95,CR | Performed by: PSYCHIATRY & NEUROLOGY

## 2021-03-30 RX ORDER — DEXTROAMPHETAMINE SACCHARATE, AMPHETAMINE ASPARTATE, DEXTROAMPHETAMINE SULFATE AND AMPHETAMINE SULFATE 2.5; 2.5; 2.5; 2.5 MG/1; MG/1; MG/1; MG/1
10 TABLET ORAL 2 TIMES DAILY
Qty: 60 TABLET | Refills: 0 | Status: SHIPPED | OUTPATIENT
Start: 2021-03-30 | End: 2021-04-29

## 2021-03-30 RX ORDER — QUETIAPINE FUMARATE 200 MG/1
200 TABLET, FILM COATED ORAL
Qty: 90 TABLET | Refills: 0 | Status: SHIPPED | OUTPATIENT
Start: 2021-03-30 | End: 2021-06-24

## 2021-03-30 RX ORDER — DEXTROAMPHETAMINE SACCHARATE, AMPHETAMINE ASPARTATE, DEXTROAMPHETAMINE SULFATE AND AMPHETAMINE SULFATE 2.5; 2.5; 2.5; 2.5 MG/1; MG/1; MG/1; MG/1
10 TABLET ORAL 2 TIMES DAILY
Qty: 60 TABLET | Refills: 0 | Status: SHIPPED | OUTPATIENT
Start: 2021-04-28 | End: 2021-05-28

## 2021-03-30 RX ORDER — DEXTROAMPHETAMINE SACCHARATE, AMPHETAMINE ASPARTATE, DEXTROAMPHETAMINE SULFATE AND AMPHETAMINE SULFATE 2.5; 2.5; 2.5; 2.5 MG/1; MG/1; MG/1; MG/1
10 TABLET ORAL 2 TIMES DAILY
Qty: 60 TABLET | Refills: 0 | Status: SHIPPED | OUTPATIENT
Start: 2021-05-27 | End: 2021-06-26

## 2021-03-30 NOTE — PROGRESS NOTES
PSYCHIATRY TELEMEDICINE FOLLOW-UP NOTE      Chief Complaint   Patient presents with   • Follow-Up     ADHD, depression       This evaluation was conducted via Zoom using secure and encrypted videoconferencing technology. The patient was in a private location in the [facility].    The patient's identity was confirmed and verbal consent was obtained for this virtual visit.    History Of Present Illness:  Deni Vences is a 20 y.o. transgender female with autism spectrum disorder, ADHD, depressive disorder comes in today for follow up, was last seen over 4 months ago.  She has been doing all right in regards to her ADHD and sleep since her last visit with me.  She is not sure if she has been depressed but she has had some concerns regarding her mood and fatigue.  She had a period where she felt really energetic and was able to accomplish and felt really happy but denies any reckless or impulsive behaviors.  She decided that she was going to up her hours from 18 hours a week to 30 hours a week.  However, she needs that extra money as well as she is planning on moving out of her mother's home.  She does not receive enough support from her mother in terms of school as well as gender transition.  She is not sure if she has enough finances to move out.  She has been doing all right in school but is feeling the pressures of both school and work.  She denies having thoughts of wanting to hurt herself.    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    Substance Use:  Alcohol - Denies   Nicotine - Denies   Cannabis - Denies  Illicit drugs - Denies    Past Medication Trials:  Prozac 20 mg, Lithium 450 mg, Trileptal 300 mg, Vyvanse 30 mg, Adderall XR 30 mg    Medications:  Current Outpatient  "Medications   Medication Sig Dispense Refill   • amphetamine-dextroamphetamine (ADDERALL) 10 MG Tab Take 1 tablet by mouth 2 times a day for 10 days. 20 tablet 0   • QUEtiapine (SEROQUEL) 200 MG Tab Take 1 tablet by mouth every bedtime. 30 tablet 0   • Estradiol Valerate 20 MG/ML Oil ADM 0.5 ML IM 1 TIME A WK     • estradiol (ESTRACE) 2 MG Tab Take 2 mg by mouth every day.  3   • progesterone (PROMETRIUM) 100 MG Cap Take 100 mg by mouth every day.  3   • spironolactone (ALDACTONE) 50 MG Tab Take 50 mg by mouth 2 Times a Day.  3     No current facility-administered medications for this visit.     Review Of Systems:    Constitutional - Positive for fatigue  Psychiatric - Negative for impaired concentration, sleep problems.  Positive for ? depression    Physical Examination:  Vital signs: Ht 1.778 m (5' 10\")   Wt 81.6 kg (180 lb)   BMI 25.83 kg/m²     Musculoskeletal: No abnormal movements.     Mental Status Evaluation:   General: Young black female, dressed in casual attire, good grooming and hygiene, in no apparent distress, calm and cooperative, poor eye contact, no psychomotor agitation or retardation  Orientation: Alert and oriented to person, place and time  Recent and remote memory: Grossly intact  Attention span and concentration: Grossly intact  Speech: Spontaneous, normal rate, rhythm and tone  Thought Process: Linear, logical and goal directed  Thought Content: Denies suicidal or homicidal ideations, intent or plan  Perception: Denies auditory or visual hallucinations. No delusions noted  Associations: Intact  Language: Appropriate  Fund of knowledge and vocabulary: Grossly adequate  Mood: \"alright\"  Affect: Euthymic, mood congruent  Insight: Good  Judgment: Good    Depression screening:  Depression Screen (PHQ-2/PHQ-9) 10/4/2018 1/18/2019 2/19/2020   PHQ-2 Total Score 0 1 0   PHQ-9 Total Score - 5 -     Interpretation of PHQ-9 Total Score   Score Severity   1-4 No Depression   5-9 Mild Depression   10-14 " Moderate Depression   15-19 Moderately Severe Depression   20-27 Severe Depression    Medical Records/Labs/Diagnostic Tests Reviewed:  [place] PDMP records - appropriate refills, no abuse suspected       Impression:  1.  ADHD, combined type - stable  2.  Depressive disorder, unspecified type - stable  3.  Autism spectrum disorder - stable  4.  Chronic insomnia - stable    Plan:  1.  Continue Adderall IR 10 mg twice daily for ADHD.  E-prescribed x 3 months.  2.  Continue Seroquel 200 mg at bedtime for mood stabilization, depression and sleep.  -AIMS 0 (2/2020)              -Metabolic monitoring (11/2020): lipid profile normal, A1c normal  -Repeat metabolic monitoring labs due in 11/2021  3.  She talked about either increasing dose of Seroquel to 300 mg or adding an antidepressant.  I talked to her that a lot of her issues can improve by seeing a therapist on a regular basis especially given the lack of support from his mother and gender transition.  She is currently in the process of changing her insurance and there is a chance that she might be going on Medicaid.  I let her know that our clinic does not take Medicaid and to call the clinic as soon as she gets the new insurance card to see if she can follow-up in this clinic or not.  If she can she should schedule a follow-up appointment with me as well as with a therapist.  If she does end up getting Medicaid she was advised to set up an appointment with both psychiatry and psychology at [facility].  4.  Provided supportive psychotherapy (> 16 minutes) in regards to mood and struggles at home and work.  Provided emotional validation on how things have been chaotic and different and lack of support from mom.  Encouraged finding a good balance between work and school and to put school as apparently.  Advised him to have at least 3 to 6 months of finances as a safety net if he does decide to move out of his mother's home.    Return to clinic in 3 months or sooner if  symptoms worsen    The proposed treatment plan was discussed with the patient who was provided the opportunity to ask questions and make suggestions regarding alternative treatment. Patient verbalized understanding and expressed agreement with the plan.     Haylee Champagne M.D.  03/30/21    This note was created using voice recognition software (Dragon). The accuracy of the dictation is limited by the abilities of the software. I have reviewed the note prior to signing, however some errors in grammar and context are still possible. If you have any questions related to this note please do not hesitate to contact our office.

## 2021-08-25 DIAGNOSIS — F90.2 ADHD (ATTENTION DEFICIT HYPERACTIVITY DISORDER), COMBINED TYPE: ICD-10-CM

## 2021-08-25 RX ORDER — DEXTROAMPHETAMINE SACCHARATE, AMPHETAMINE ASPARTATE, DEXTROAMPHETAMINE SULFATE AND AMPHETAMINE SULFATE 2.5; 2.5; 2.5; 2.5 MG/1; MG/1; MG/1; MG/1
10 TABLET ORAL 2 TIMES DAILY
Qty: 60 TABLET | Refills: 0 | Status: SHIPPED | OUTPATIENT
Start: 2021-08-25 | End: 2021-09-24

## 2021-10-11 ENCOUNTER — APPOINTMENT (OUTPATIENT)
Dept: BEHAVIORAL HEALTH | Facility: CLINIC | Age: 21
End: 2021-10-11
Payer: MEDICAID

## 2021-10-11 DIAGNOSIS — F32.A DEPRESSIVE DISORDER: ICD-10-CM

## 2021-10-11 DIAGNOSIS — F51.04 CHRONIC INSOMNIA: ICD-10-CM

## 2021-10-11 DIAGNOSIS — F90.2 ADHD (ATTENTION DEFICIT HYPERACTIVITY DISORDER), COMBINED TYPE: ICD-10-CM

## 2021-10-11 RX ORDER — QUETIAPINE FUMARATE 200 MG/1
200 TABLET, FILM COATED ORAL
Qty: 30 TABLET | Refills: 0 | Status: SHIPPED | OUTPATIENT
Start: 2021-10-11 | End: 2021-11-24

## 2021-10-11 RX ORDER — DEXTROAMPHETAMINE SACCHARATE, AMPHETAMINE ASPARTATE, DEXTROAMPHETAMINE SULFATE AND AMPHETAMINE SULFATE 2.5; 2.5; 2.5; 2.5 MG/1; MG/1; MG/1; MG/1
10 TABLET ORAL 2 TIMES DAILY
Qty: 60 TABLET | Refills: 0 | Status: SHIPPED | OUTPATIENT
Start: 2021-10-11 | End: 2021-11-10

## 2021-10-11 RX ORDER — DEXTROAMPHETAMINE SACCHARATE, AMPHETAMINE ASPARTATE, DEXTROAMPHETAMINE SULFATE AND AMPHETAMINE SULFATE 2.5; 2.5; 2.5; 2.5 MG/1; MG/1; MG/1; MG/1
10 TABLET ORAL 2 TIMES DAILY
Qty: 60 TABLET | Refills: 0 | Status: CANCELLED | OUTPATIENT
Start: 2021-10-11 | End: 2021-11-10

## 2021-10-11 NOTE — TELEPHONE ENCOUNTER
Pt is requesting refill while waiting for her appt.on 11/01/21       Received request via: Patient    Was the patient seen in the last year in this department? No     Does the patient have an active prescription (recently filled or refills available) for medication(s) requested? No

## 2021-10-11 NOTE — TELEPHONE ENCOUNTER
Patient was not seen today because of lack of insurance and being unable to pay for the appointment.  I am refilling her medications for 30 days.  Her appointment has been rescheduled for next month when she is able to be a self-pay.

## 2021-11-11 ENCOUNTER — APPOINTMENT (OUTPATIENT)
Dept: BEHAVIORAL HEALTH | Facility: CLINIC | Age: 21
End: 2021-11-11
Payer: MEDICAID

## 2021-11-15 ENCOUNTER — PATIENT MESSAGE (OUTPATIENT)
Dept: BEHAVIORAL HEALTH | Facility: CLINIC | Age: 21
End: 2021-11-15

## 2021-11-15 DIAGNOSIS — F90.2 ADHD (ATTENTION DEFICIT HYPERACTIVITY DISORDER), COMBINED TYPE: ICD-10-CM

## 2021-11-15 RX ORDER — DEXTROAMPHETAMINE SACCHARATE, AMPHETAMINE ASPARTATE, DEXTROAMPHETAMINE SULFATE AND AMPHETAMINE SULFATE 2.5; 2.5; 2.5; 2.5 MG/1; MG/1; MG/1; MG/1
10 TABLET ORAL 2 TIMES DAILY
Qty: 60 TABLET | Refills: 0 | OUTPATIENT
Start: 2021-11-15 | End: 2021-12-15

## 2021-11-15 RX ORDER — DEXTROAMPHETAMINE SACCHARATE, AMPHETAMINE ASPARTATE, DEXTROAMPHETAMINE SULFATE AND AMPHETAMINE SULFATE 2.5; 2.5; 2.5; 2.5 MG/1; MG/1; MG/1; MG/1
10 TABLET ORAL 2 TIMES DAILY
Qty: 34 TABLET | Refills: 0 | Status: SHIPPED | OUTPATIENT
Start: 2021-11-15 | End: 2021-12-02

## 2021-11-15 NOTE — TELEPHONE ENCOUNTER
Upcoming appt. 12/02    Received request via: Patient    Was the patient seen in the last year in this department? No     Does the patient have an active prescription (recently filled or refills available) for medication(s) requested? No

## 2021-12-02 ENCOUNTER — TELEMEDICINE (OUTPATIENT)
Dept: BEHAVIORAL HEALTH | Facility: CLINIC | Age: 21
End: 2021-12-02
Payer: COMMERCIAL

## 2021-12-02 DIAGNOSIS — F90.2 ADHD (ATTENTION DEFICIT HYPERACTIVITY DISORDER), COMBINED TYPE: ICD-10-CM

## 2021-12-02 DIAGNOSIS — F84.0 AUTISM SPECTRUM DISORDER: ICD-10-CM

## 2021-12-02 DIAGNOSIS — F33.1 MDD (MAJOR DEPRESSIVE DISORDER), RECURRENT EPISODE, MODERATE (HCC): ICD-10-CM

## 2021-12-02 DIAGNOSIS — F51.04 CHRONIC INSOMNIA: ICD-10-CM

## 2021-12-02 DIAGNOSIS — Z79.899 ENCOUNTER FOR LONG-TERM (CURRENT) USE OF MEDICATIONS: ICD-10-CM

## 2021-12-02 PROCEDURE — 99214 OFFICE O/P EST MOD 30 MIN: CPT | Mod: 95 | Performed by: PSYCHIATRY & NEUROLOGY

## 2021-12-02 PROCEDURE — 90833 PSYTX W PT W E/M 30 MIN: CPT | Mod: 95 | Performed by: PSYCHIATRY & NEUROLOGY

## 2021-12-02 RX ORDER — ESCITALOPRAM OXALATE 10 MG/1
TABLET ORAL
Qty: 27 TABLET | Refills: 1 | Status: SHIPPED | OUTPATIENT
Start: 2021-12-02 | End: 2022-01-01

## 2021-12-02 RX ORDER — DEXTROAMPHETAMINE SACCHARATE, AMPHETAMINE ASPARTATE, DEXTROAMPHETAMINE SULFATE AND AMPHETAMINE SULFATE 2.5; 2.5; 2.5; 2.5 MG/1; MG/1; MG/1; MG/1
10 TABLET ORAL 2 TIMES DAILY
Qty: 60 TABLET | Refills: 0 | Status: SHIPPED | OUTPATIENT
Start: 2021-12-02 | End: 2022-01-01

## 2021-12-02 RX ORDER — DEXTROAMPHETAMINE SACCHARATE, AMPHETAMINE ASPARTATE, DEXTROAMPHETAMINE SULFATE AND AMPHETAMINE SULFATE 2.5; 2.5; 2.5; 2.5 MG/1; MG/1; MG/1; MG/1
10 TABLET ORAL 2 TIMES DAILY
Qty: 60 TABLET | Refills: 0 | Status: SHIPPED | OUTPATIENT
Start: 2021-12-31 | End: 2022-01-30

## 2021-12-02 RX ORDER — QUETIAPINE FUMARATE 200 MG/1
200 TABLET, FILM COATED ORAL
Qty: 30 TABLET | Refills: 1 | Status: SHIPPED | OUTPATIENT
Start: 2021-12-02 | End: 2022-01-21 | Stop reason: SDUPTHER

## 2021-12-02 NOTE — PROGRESS NOTES
PSYCHIATRY TELEMEDICINE FOLLOW-UP NOTE      Chief Complaint   Patient presents with   • Follow-Up     depression, ADHD       This evaluation was conducted via Zoom using secure and encrypted videoconferencing technology. The patient was in a private location in the [facility].    The patient's identity was confirmed and verbal consent was obtained for this virtual visit.    History Of Present Illness:  Deni Vences is a 20 y.o. transgender female with autism spectrum disorder, ADHD, depressive disorder comes in today for follow up, was last seen over 8 months ago.  She has been having a tough time in regards to depression for the last few months.  She had to quit her job at [employer] in case and since then finding a job has been tough.  She has noticed struggles at her job because of underlying autism spectrum disorder.  She had to move back with mom in the relationship is the same.  She has noticed increased struggles with her memory as well.  She graduated from [employer] with a bachelor's degree and was unable to get into graduate school.  She decided to go to [employer] and takes in [employer] in classes.  She has not been doing too well in school as well.  She is doing good in regards to sleep and appetite.  She but denies dwelling on those thoughts or having active thoughts, intent or plan of wanting to hurt herself.  She denies any self-harm behavior.  She denies any recent reckless or impulsive behaviors as well.    Social History:   She is single, identifies as bisexual, lives with mother in [facility], father and younger brother lives in [place], student at [employer], taking [employer] classes, has bachelor's degree in philosophy, unemployed.    Substance Use:  Alcohol - Denies   Nicotine - Denies   Cannabis - Denies  Illicit drugs - Denies    Past Medication Trials:  Prozac 20 mg, Lithium 450 mg, Trileptal 300 mg, Vyvanse 30 mg, Adderall XR 30 mg    Medications:  Current Outpatient Medications  "  Medication Sig Dispense Refill   • QUEtiapine (SEROQUEL) 200 MG Tab Take 1 Tablet by mouth at bedtime for 10 days. 10 Tablet 0   • amphetamine-dextroamphetamine (ADDERALL) 10 MG Tab Take 1 Tablet by mouth 2 times a day for 17 days. 34 Tablet 0   • estradiol (ESTRACE) 2 MG Tab Take 2 mg by mouth every day.  3   • progesterone (PROMETRIUM) 100 MG Cap Take 100 mg by mouth every day.  3   • spironolactone (ALDACTONE) 50 MG Tab Take 50 mg by mouth 2 Times a Day.  3     No current facility-administered medications for this visit.     Review Of Systems:    Constitutional - Positive for fatigue  Psychiatric - Positive for depression    Physical Examination:  Vital signs: There were no vitals taken for this visit.    Musculoskeletal: No abnormal movements.     Mental Status Evaluation:   General: Young black female, dressed in casual attire, good grooming and hygiene, in no apparent distress, calm and cooperative, poor eye contact, no psychomotor agitation or retardation  Orientation: Alert and oriented to person, place and time  Recent and remote memory: Grossly intact  Attention span and concentration: Grossly intact  Speech: Spontaneous, normal rate, rhythm and tone  Thought Process: Linear, logical and goal directed  Thought Content: Positive for passive suicidal ideations.  Denies active suicidal or homicidal ideations, intent or plan  Perception: No delusions noted  Associations: Intact  Language: Appropriate  Fund of knowledge and vocabulary: Grossly adequate  Mood: \"it has been bad\"  Affect: Dysphoric, mood congruent  Insight: Good  Judgment: Good    Depression screening:  Depression Screen (PHQ-2/PHQ-9) 10/4/2018 1/18/2019 2/19/2020   PHQ-2 Total Score 0 1 0   PHQ-9 Total Score - 5 -     Interpretation of PHQ-9 Total Score   Score Severity   1-4 No Depression   5-9 Mild Depression   10-14 Moderate Depression   15-19 Moderately Severe Depression   20-27 Severe Depression    Medical Records/Labs/Diagnostic Tests " Reviewed:  [place] PDMP records - appropriate refills, no abuse suspected       Impression:  1.  ADHD, combined type - stable  2.  Major depressive disorder, recurrent, moderate - worsening   3.  Autism spectrum disorder - stable  4.  Chronic insomnia - stable  5.  Long-term use of medications - Seroquel    Plan:  1.  Start Lexapro 5 mg daily for 1 week and then increase to 10 mg daily for depression.  Discussed 4-6 week period to assess for efficacy. Discussed side effects including nausea/vomiting, abdominal discomfort/pain, diarrhea, headaches, drowsiness, sleep disturbances, initial transient worsening of anxiety, agitation, hypertension, fatigue, excessive sweating, dry mouth, sexual dysfunction etc. discussed FDA black box warning of worsening suicidal ideations in younger adults and advised her to stop taking Lexapro if she notices any worsening suicidal thoughts.  2.  Continue Adderall IR 10 mg twice daily for ADHD.  E-prescribed x 2 months.  3.  Continue Seroquel 200 mg at bedtime for mood stabilization, depression and sleep.  -AIMS 0 (2/2020)              -Metabolic monitoring (11/2020): lipid profile normal, A1c normal  -Repeat metabolic monitoring labs ordered today, lipid profile and A1c  4.  I reminded her of the scheduled medication policy and that future refills will be done only on appointments.  5.  Provided supportive psychotherapy (> 16 minutes).  Provided support in regards to worsening depression and difficulties with employment.  Encouraged her to stay hopeful and to find a job that will be suitable given underlying autism spectrum disorder.  Advised self care.    Return to clinic in 6 weeks or sooner if symptoms worsen    The proposed treatment plan was discussed with the patient who was provided the opportunity to ask questions and make suggestions regarding alternative treatment. Patient verbalized understanding and expressed agreement with the plan.     Haylee Champagne M.D.  12/02/21    This  note was created using voice recognition software (Dragon). The accuracy of the dictation is limited by the abilities of the software. I have reviewed the note prior to signing, however some errors in grammar and context are still possible. If you have any questions related to this note please do not hesitate to contact our office.

## 2022-01-21 ENCOUNTER — TELEMEDICINE (OUTPATIENT)
Dept: BEHAVIORAL HEALTH | Facility: CLINIC | Age: 22
End: 2022-01-21
Payer: COMMERCIAL

## 2022-01-21 VITALS — BODY MASS INDEX: 25.83 KG/M2 | HEIGHT: 70 IN

## 2022-01-21 DIAGNOSIS — F51.04 CHRONIC INSOMNIA: ICD-10-CM

## 2022-01-21 DIAGNOSIS — F33.41 MDD (MAJOR DEPRESSIVE DISORDER), RECURRENT, IN PARTIAL REMISSION (HCC): ICD-10-CM

## 2022-01-21 DIAGNOSIS — F90.2 ADHD (ATTENTION DEFICIT HYPERACTIVITY DISORDER), COMBINED TYPE: ICD-10-CM

## 2022-01-21 DIAGNOSIS — F84.0 AUTISM SPECTRUM DISORDER: ICD-10-CM

## 2022-01-21 PROCEDURE — 99214 OFFICE O/P EST MOD 30 MIN: CPT | Mod: 95 | Performed by: PSYCHIATRY & NEUROLOGY

## 2022-01-21 RX ORDER — DEXTROAMPHETAMINE SACCHARATE, AMPHETAMINE ASPARTATE, DEXTROAMPHETAMINE SULFATE AND AMPHETAMINE SULFATE 2.5; 2.5; 2.5; 2.5 MG/1; MG/1; MG/1; MG/1
10 TABLET ORAL 2 TIMES DAILY
Qty: 60 TABLET | Refills: 0 | Status: SHIPPED | OUTPATIENT
Start: 2022-01-31 | End: 2022-03-02

## 2022-01-21 RX ORDER — DEXTROAMPHETAMINE SACCHARATE, AMPHETAMINE ASPARTATE, DEXTROAMPHETAMINE SULFATE AND AMPHETAMINE SULFATE 2.5; 2.5; 2.5; 2.5 MG/1; MG/1; MG/1; MG/1
10 TABLET ORAL 2 TIMES DAILY
Qty: 60 TABLET | Refills: 0 | Status: SHIPPED | OUTPATIENT
Start: 2022-03-01 | End: 2022-03-31

## 2022-01-21 RX ORDER — QUETIAPINE FUMARATE 200 MG/1
200 TABLET, FILM COATED ORAL
Qty: 90 TABLET | Refills: 0 | Status: SHIPPED | OUTPATIENT
Start: 2022-01-21 | End: 2022-05-06

## 2022-01-21 RX ORDER — ESCITALOPRAM OXALATE 10 MG/1
10 TABLET ORAL EVERY MORNING
Qty: 90 TABLET | Refills: 0 | Status: SHIPPED | OUTPATIENT
Start: 2022-01-21 | End: 2022-04-21 | Stop reason: SDUPTHER

## 2022-01-21 RX ORDER — DEXTROAMPHETAMINE SACCHARATE, AMPHETAMINE ASPARTATE, DEXTROAMPHETAMINE SULFATE AND AMPHETAMINE SULFATE 2.5; 2.5; 2.5; 2.5 MG/1; MG/1; MG/1; MG/1
10 TABLET ORAL 2 TIMES DAILY
Qty: 60 TABLET | Refills: 0 | Status: SHIPPED
Start: 2022-03-30 | End: 2022-04-21

## 2022-01-21 NOTE — PROGRESS NOTES
PSYCHIATRY VIRTUAL VISIT FOLLOW-UP NOTE      Chief Complaint   Patient presents with   • Follow-Up     depression, ADHD       This evaluation was conducted via Zoom using secure and encrypted videoconferencing technology.   The patient was in a private location in the [facility].    The patient's identity was confirmed and verbal consent was obtained for this virtual visit.    History Of Present Illness:  Deni Vences is a 21 y.o. transgender female with autism spectrum disorder, ADHD, major depressive disorder comes in today for follow up, was last seen 6 weeks ago.  She is doing a lot better in regards to depression since her last appointment.  She has been compliant with Lexapro and it has been beneficial.  She denies any side effects from Lexapro at this time.  She has been able to do more around the house and that has helped improve relationship with her mother.  She is still looking for a job so that finances can improve.  She denies any other psychosocial stressors.  She has been doing alright in regards to her ADHD symptoms.  She denies having thoughts of wanting to hurt herself.    Social History:   She is single, identifies as bisexual, lives with mother in [place], father and younger brother lives in [place], student at [employer], taking [employer] classes, has bachelor's degree in philosophy, unemployed.    Substance Use:  Alcohol - Denies   Nicotine - Denies   Cannabis - Denies  Illicit drugs - Denies    Past Medication Trials:  Prozac 20 mg, Lithium 450 mg, Trileptal 300 mg, Vyvanse 30 mg, Adderall XR 30 mg    Medications:  Current Outpatient Medications   Medication Sig Dispense Refill   • [START ON 3/30/2022] amphetamine-dextroamphetamine (ADDERALL) 10 MG Tab Take 1 Tablet by mouth 2 times a day for 30 days. 60 Tablet 0   • [START ON 3/1/2022] amphetamine-dextroamphetamine (ADDERALL) 10 MG Tab Take 1 Tablet by mouth 2 times a day for 30 days. 60 Tablet 0   • [START ON 1/31/2022]  "amphetamine-dextroamphetamine (ADDERALL) 10 MG Tab Take 1 Tablet by mouth 2 times a day for 30 days. 60 Tablet 0   • escitalopram (LEXAPRO) 10 MG Tab Take 1 Tablet by mouth every morning. 90 Tablet 0   • QUEtiapine (SEROQUEL) 200 MG Tab Take 1 Tablet by mouth at bedtime. 90 Tablet 0   • amphetamine-dextroamphetamine (ADDERALL) 10 MG Tab Take 1 Tablet by mouth 2 times a day for 30 days. 60 Tablet 0   • estradiol (ESTRACE) 2 MG Tab Take 2 mg by mouth every day.  3   • progesterone (PROMETRIUM) 100 MG Cap Take 100 mg by mouth every day.  3   • spironolactone (ALDACTONE) 50 MG Tab Take 50 mg by mouth 2 Times a Day.  3     No current facility-administered medications for this visit.     Review Of Systems:    Constitutional - Positive for fatigue  Psychiatric - Positive for infrequent depression    Physical Examination:  Vital signs: Ht 1.778 m (5' 10\")   BMI 25.83 kg/m²     Musculoskeletal: No abnormal movements.     Mental Status Evaluation:   General: Young black female, dressed in casual attire, good grooming and hygiene, in no apparent distress, calm and cooperative, poor eye contact, no psychomotor agitation or retardation  Orientation: Alert and oriented to person, place and time  Recent and remote memory: Grossly intact  Attention span and concentration: Grossly intact  Speech: Spontaneous, normal rate, rhythm and tone  Thought Process: Linear, logical and goal directed  Thought Content: Denies suicidal or homicidal ideations, intent or plan  Perception: No delusions noted  Associations: Intact  Language: Appropriate  Fund of knowledge and vocabulary: Grossly adequate  Mood: \"good\"  Affect: Euthymic, mood congruent  Insight: Good  Judgment: Good    Depression screening:  Depression Screen (PHQ-2/PHQ-9) 10/4/2018 1/18/2019 2/19/2020   PHQ-2 Total Score 0 1 0   PHQ-9 Total Score - 5 -     Interpretation of PHQ-9 Total Score   Score Severity   1-4 No Depression   5-9 Mild Depression   10-14 Moderate Depression "   15-19 Moderately Severe Depression   20-27 Severe Depression    Medical Records/Labs/Diagnostic Tests Reviewed:  [place] PDMP records - appropriate refills, no abuse suspected       Impression:  1.  ADHD, combined type - stable  2.  Major depressive disorder, recurrent, in partial remission - improving   3.  Autism spectrum disorder - stable  4.  Chronic insomnia - stable    Plan:  1.  Continue Lexapro 10 mg daily for depression  2.  Continue Seroquel 200 mg at bedtime for depression augmentation and sleep.  -AIMS 0 (2/2020)              -Metabolic monitoring (11/2020): lipid profile normal, A1c normal  -Repeat metabolic monitoring labs ordered at last appointment, still pending, she was reminded to get the labs done before her next follow-up appointment  3.  Continue Adderall IR 10 mg twice daily for ADHD.  E-prescribed x 3 months.    Return to clinic in 3 months or sooner if symptoms worsen    The proposed treatment plan was discussed with the patient who was provided the opportunity to ask questions and make suggestions regarding alternative treatment. Patient verbalized understanding and expressed agreement with the plan.     Haylee Champagne M.D.  01/21/22    This note was created using voice recognition software (Dragon). The accuracy of the dictation is limited by the abilities of the software. I have reviewed the note prior to signing, however some errors in grammar and context are still possible. If you have any questions related to this note please do not hesitate to contact our office.

## 2022-04-21 ENCOUNTER — TELEMEDICINE (OUTPATIENT)
Dept: BEHAVIORAL HEALTH | Facility: CLINIC | Age: 22
End: 2022-04-21
Payer: COMMERCIAL

## 2022-04-21 DIAGNOSIS — F84.0 AUTISM SPECTRUM DISORDER: ICD-10-CM

## 2022-04-21 DIAGNOSIS — F33.41 MDD (MAJOR DEPRESSIVE DISORDER), RECURRENT, IN PARTIAL REMISSION (HCC): ICD-10-CM

## 2022-04-21 DIAGNOSIS — F51.04 CHRONIC INSOMNIA: ICD-10-CM

## 2022-04-21 DIAGNOSIS — F90.2 ADHD (ATTENTION DEFICIT HYPERACTIVITY DISORDER), COMBINED TYPE: ICD-10-CM

## 2022-04-21 PROCEDURE — 99214 OFFICE O/P EST MOD 30 MIN: CPT | Mod: 95 | Performed by: PSYCHIATRY & NEUROLOGY

## 2022-04-21 RX ORDER — DEXTROAMPHETAMINE SACCHARATE, AMPHETAMINE ASPARTATE, DEXTROAMPHETAMINE SULFATE AND AMPHETAMINE SULFATE 3.75; 3.75; 3.75; 3.75 MG/1; MG/1; MG/1; MG/1
15 TABLET ORAL 2 TIMES DAILY
Qty: 60 TABLET | Refills: 0 | Status: SHIPPED | OUTPATIENT
Start: 2022-04-21 | End: 2022-05-21

## 2022-04-21 RX ORDER — DEXTROAMPHETAMINE SACCHARATE, AMPHETAMINE ASPARTATE, DEXTROAMPHETAMINE SULFATE AND AMPHETAMINE SULFATE 2.5; 2.5; 2.5; 2.5 MG/1; MG/1; MG/1; MG/1
10 TABLET ORAL 2 TIMES DAILY
Qty: 60 TABLET | Refills: 0 | Status: CANCELLED | OUTPATIENT
Start: 2022-05-30 | End: 2022-06-29

## 2022-04-21 RX ORDER — DEXTROAMPHETAMINE SACCHARATE, AMPHETAMINE ASPARTATE, DEXTROAMPHETAMINE SULFATE AND AMPHETAMINE SULFATE 3.75; 3.75; 3.75; 3.75 MG/1; MG/1; MG/1; MG/1
15 TABLET ORAL 2 TIMES DAILY
Qty: 60 TABLET | Refills: 0 | Status: SHIPPED | OUTPATIENT
Start: 2022-06-18 | End: 2022-07-18

## 2022-04-21 RX ORDER — DEXTROAMPHETAMINE SACCHARATE, AMPHETAMINE ASPARTATE, DEXTROAMPHETAMINE SULFATE AND AMPHETAMINE SULFATE 2.5; 2.5; 2.5; 2.5 MG/1; MG/1; MG/1; MG/1
10 TABLET ORAL 2 TIMES DAILY
Qty: 60 TABLET | Refills: 0 | Status: CANCELLED | OUTPATIENT
Start: 2022-06-28 | End: 2022-07-28

## 2022-04-21 RX ORDER — DEXTROAMPHETAMINE SACCHARATE, AMPHETAMINE ASPARTATE, DEXTROAMPHETAMINE SULFATE AND AMPHETAMINE SULFATE 2.5; 2.5; 2.5; 2.5 MG/1; MG/1; MG/1; MG/1
10 TABLET ORAL 2 TIMES DAILY
Qty: 60 TABLET | Refills: 0 | Status: CANCELLED | OUTPATIENT
Start: 2022-05-01 | End: 2022-05-31

## 2022-04-21 RX ORDER — ESCITALOPRAM OXALATE 10 MG/1
10 TABLET ORAL EVERY MORNING
Qty: 90 TABLET | Refills: 0 | Status: SHIPPED | OUTPATIENT
Start: 2022-04-21 | End: 2022-07-18 | Stop reason: SDUPTHER

## 2022-04-21 RX ORDER — PROGESTERONE 100 MG/1
CAPSULE ORAL
COMMUNITY
Start: 2022-02-10

## 2022-04-21 RX ORDER — DEXTROAMPHETAMINE SACCHARATE, AMPHETAMINE ASPARTATE, DEXTROAMPHETAMINE SULFATE AND AMPHETAMINE SULFATE 3.75; 3.75; 3.75; 3.75 MG/1; MG/1; MG/1; MG/1
15 TABLET ORAL 2 TIMES DAILY
Qty: 60 TABLET | Refills: 0 | Status: SHIPPED | OUTPATIENT
Start: 2022-05-20 | End: 2022-06-19

## 2022-04-21 NOTE — PROGRESS NOTES
PSYCHIATRY VIRTUAL VISIT FOLLOW-UP NOTE      Chief Complaint   Patient presents with   • Follow-Up     ADHD, anxiety, depression       This evaluation was conducted via Zoom using secure and encrypted videoconferencing technology.   The patient was in their home in the [facility].    The patient's identity was confirmed and verbal consent was obtained for this virtual visit.    History Of Present Illness:  Deni Vences is a 21 y.o. transgender female with autism spectrum disorder, ADHD, major depressive disorder comes in today for follow up, was last seen 3 months ago.  She has been doing good in regards to her mental health since her last appointment with me.  She mentions that Lexapro has been helpful for depression and she has better motivation and energy.  She has been taking her Adderall consistently and is noticing some struggles with focus and concentration.  She now has a part-time job along with school and enjoys doing some hobbies on a regular basis.  She has noticed some struggles with her focus and concentration.  She feels that Adderall helps her stay focused for about 4 hours.  She denies any side effect of the medication.  She denies any other psychosocial stressors.  She denies having thoughts of wanting to hurt herself.    Social History:   She is single, identifies as bisexual, lives with mother in [facility], father and younger brother lives in [place], student at [employer], taking [employer] classes, bachelor's degree in philosophy, part time employed at [employer].    Substance Use:  Alcohol - Denies   Nicotine - Denies   Cannabis - Denies  Illicit drugs - Denies    Past Medication Trials:  Prozac 20 mg, Lithium 450 mg, Trileptal 300 mg, Vyvanse 30 mg, Adderall XR 30 mg    Medications:  Current Outpatient Medications   Medication Sig Dispense Refill   • progesterone (PROMETRIUM) 100 MG Cap      • amphetamine-dextroamphetamine (ADDERALL) 10 MG Tab Take 1 Tablet by mouth 2 times a  "day for 30 days. 60 Tablet 0   • escitalopram (LEXAPRO) 10 MG Tab Take 1 Tablet by mouth every morning. 90 Tablet 0   • QUEtiapine (SEROQUEL) 200 MG Tab Take 1 Tablet by mouth at bedtime. 90 Tablet 0   • estradiol (ESTRACE) 2 MG Tab Take 2 mg by mouth every day.  3     No current facility-administered medications for this visit.     Review Of Systems:    Constitutional - Positive for fatigue  Psychiatric - Positive for infrequent depression    Physical Examination:  Vital signs: There were no vitals taken for this visit.    Musculoskeletal: No abnormal movements.     Mental Status Evaluation:   General: Young black female, dressed in casual attire, good grooming and hygiene, in no apparent distress, calm and cooperative, poor eye contact, no psychomotor agitation or retardation  Orientation: Alert and oriented to person, place and time  Recent and remote memory: Grossly intact  Attention span and concentration: Grossly intact  Speech: Spontaneous, normal rate, rhythm and tone  Thought Process: Linear, logical and goal directed  Thought Content: Denies suicidal or homicidal ideations, intent or plan  Perception: No delusions noted  Associations: Intact  Language: Appropriate  Fund of knowledge and vocabulary: Grossly adequate  Mood: \"mildly okay\"  Affect: Euthymic, mood congruent  Insight: Good  Judgment: Good    Depression screening:  Depression Screen (PHQ-2/PHQ-9) 10/4/2018 1/18/2019 2/19/2020   PHQ-2 Total Score 0 1 0   PHQ-9 Total Score - 5 -     Interpretation of PHQ-9 Total Score   Score Severity   1-4 No Depression   5-9 Mild Depression   10-14 Moderate Depression   15-19 Moderately Severe Depression   20-27 Severe Depression    Medical Records/Labs/Diagnostic Tests Reviewed:  [place] PDMP records - appropriate refills, no abuse suspected       Impression:  1.  ADHD, combined type - slight worsening   2.  Major depressive disorder, recurrent, in partial remission - stable  3.  Autism spectrum disorder - stable  4. "  Chronic insomnia - stable    Plan:  1.  Continue Lexapro 10 mg daily for depression  2.  Continue Seroquel 200 mg at bedtime for depression augmentation and sleep.  -AIMS 0 (2/2020)              -Metabolic monitoring (11/2020): lipid profile normal, A1c normal  -Repeat metabolic monitoring labs ordered previously, still pending, he plans on getting the labs done tomorrow  3.  Increase Adderall IR to 15 mg twice daily for ADHD.  E-prescribed x 3 months.  I have advised her to monitor for worsening anxiety, appetite or insomnia with dose increase.    Return to clinic in 3 months or sooner if symptoms worsen    The proposed treatment plan was discussed with the patient who was provided the opportunity to ask questions and make suggestions regarding alternative treatment. Patient verbalized understanding and expressed agreement with the plan.     Haylee Champagne M.D.  04/21/22    This note was created using voice recognition software (Dragon). The accuracy of the dictation is limited by the abilities of the software. I have reviewed the note prior to signing, however some errors in grammar and context are still possible. If you have any questions related to this note please do not hesitate to contact our office.

## 2022-05-05 ENCOUNTER — HOSPITAL ENCOUNTER (OUTPATIENT)
Dept: LAB | Facility: MEDICAL CENTER | Age: 22
End: 2022-05-05
Attending: PSYCHIATRY & NEUROLOGY
Payer: COMMERCIAL

## 2022-05-05 DIAGNOSIS — Z79.899 ENCOUNTER FOR LONG-TERM (CURRENT) USE OF MEDICATIONS: ICD-10-CM

## 2022-05-05 LAB
CHOLEST SERPL-MCNC: 137 MG/DL (ref 100–199)
EST. AVERAGE GLUCOSE BLD GHB EST-MCNC: 105 MG/DL
HBA1C MFR BLD: 5.3 % (ref 4–5.6)
HDLC SERPL-MCNC: 67 MG/DL
LDLC SERPL CALC-MCNC: 54 MG/DL
TRIGL SERPL-MCNC: 81 MG/DL (ref 0–149)

## 2022-05-05 PROCEDURE — 36415 COLL VENOUS BLD VENIPUNCTURE: CPT

## 2022-05-05 PROCEDURE — 83036 HEMOGLOBIN GLYCOSYLATED A1C: CPT

## 2022-05-05 PROCEDURE — 80061 LIPID PANEL: CPT

## 2022-07-18 ENCOUNTER — TELEMEDICINE (OUTPATIENT)
Dept: BEHAVIORAL HEALTH | Facility: CLINIC | Age: 22
End: 2022-07-18
Payer: COMMERCIAL

## 2022-07-18 DIAGNOSIS — F33.41 MDD (MAJOR DEPRESSIVE DISORDER), RECURRENT, IN PARTIAL REMISSION (HCC): ICD-10-CM

## 2022-07-18 DIAGNOSIS — F84.0 AUTISM SPECTRUM DISORDER: ICD-10-CM

## 2022-07-18 DIAGNOSIS — F90.2 ADHD (ATTENTION DEFICIT HYPERACTIVITY DISORDER), COMBINED TYPE: ICD-10-CM

## 2022-07-18 DIAGNOSIS — F51.04 CHRONIC INSOMNIA: ICD-10-CM

## 2022-07-18 PROCEDURE — 99214 OFFICE O/P EST MOD 30 MIN: CPT | Mod: 95 | Performed by: PSYCHIATRY & NEUROLOGY

## 2022-07-18 RX ORDER — DEXTROAMPHETAMINE SACCHARATE, AMPHETAMINE ASPARTATE, DEXTROAMPHETAMINE SULFATE AND AMPHETAMINE SULFATE 3.75; 3.75; 3.75; 3.75 MG/1; MG/1; MG/1; MG/1
15 TABLET ORAL 2 TIMES DAILY
Qty: 60 TABLET | Refills: 0 | Status: SHIPPED | OUTPATIENT
Start: 2022-07-21 | End: 2022-08-20

## 2022-07-18 RX ORDER — DEXTROAMPHETAMINE SACCHARATE, AMPHETAMINE ASPARTATE, DEXTROAMPHETAMINE SULFATE AND AMPHETAMINE SULFATE 3.75; 3.75; 3.75; 3.75 MG/1; MG/1; MG/1; MG/1
15 TABLET ORAL 2 TIMES DAILY
Qty: 60 TABLET | Refills: 0 | Status: SHIPPED
Start: 2022-09-17 | End: 2022-10-14

## 2022-07-18 RX ORDER — QUETIAPINE FUMARATE 200 MG/1
200 TABLET, FILM COATED ORAL
Qty: 90 TABLET | Refills: 0 | Status: SHIPPED | OUTPATIENT
Start: 2022-07-18 | End: 2022-10-14 | Stop reason: SDUPTHER

## 2022-07-18 RX ORDER — ESCITALOPRAM OXALATE 10 MG/1
10 TABLET ORAL EVERY MORNING
Qty: 90 TABLET | Refills: 0 | Status: SHIPPED | OUTPATIENT
Start: 2022-07-18 | End: 2022-10-14 | Stop reason: SDUPTHER

## 2022-07-18 RX ORDER — DEXTROAMPHETAMINE SACCHARATE, AMPHETAMINE ASPARTATE, DEXTROAMPHETAMINE SULFATE AND AMPHETAMINE SULFATE 3.75; 3.75; 3.75; 3.75 MG/1; MG/1; MG/1; MG/1
15 TABLET ORAL 2 TIMES DAILY
Qty: 60 TABLET | Refills: 0 | Status: SHIPPED | OUTPATIENT
Start: 2022-08-19 | End: 2022-09-18

## 2022-07-18 NOTE — PROGRESS NOTES
PSYCHIATRY VIRTUAL VISIT FOLLOW-UP NOTE      Chief Complaint   Patient presents with   • Follow-Up     ADHD, depression       This evaluation was conducted via Zoom using secure and encrypted videoconferencing technology.   The patient was in their home in the [facility].    The patient's identity was confirmed and verbal consent was obtained for this virtual visit.    History Of Present Illness:  Deni Vences is a 22 y.o. transgender female with autism spectrum disorder, ADHD, major depressive disorder comes in today for follow up, was last seen 3 months ago.  She has been doing all right in regards to her mental health.  She has been taking the higher dose of Adderall and has been focusing better on tasks.  She has not noticed any side effects with the higher dose.  She denies any increase in anxiety or worsening insomnia with the higher dose of Adderall she is actively looking for a new job.  She denies any struggles with depression.  She denies having to hurt herself.    Social History:   She is single, identifies as bisexual, lives with mother in [facility], father and younger brother lives in [place], student at [employer], taking [employer] classes, bachelor's degree in philosophy, unemployed but actively looking for a job.    Substance Use:  Alcohol - Denies   Nicotine - Denies   Cannabis - Denies  Illicit drugs - Denies    Past Medication Trials:  Prozac 20 mg, Lithium 450 mg, Trileptal 300 mg, Vyvanse 30 mg, Adderall XR 30 mg    Medications:  Current Outpatient Medications   Medication Sig Dispense Refill   • QUEtiapine (SEROQUEL) 200 MG Tab Take 1 Tablet by mouth at bedtime. 90 Tablet 0   • progesterone (PROMETRIUM) 100 MG Cap      • amphetamine-dextroamphetamine (ADDERALL) 15 MG tablet Take 1 Tablet by mouth 2 times a day for 30 days. 60 Tablet 0   • escitalopram (LEXAPRO) 10 MG Tab Take 1 Tablet by mouth every morning. 90 Tablet 0   • estradiol (ESTRACE) 2 MG Tab Take 2 mg by mouth every  "day.  3     No current facility-administered medications for this visit.     Review Of Systems:    Constitutional - Positive for fatigue  Psychiatric - Positive for infrequent depression    Physical Examination:  Vital signs: There were no vitals taken for this visit.    Musculoskeletal: No abnormal movements.     Mental Status Evaluation:   General: Young black female, dressed in casual attire, good grooming and hygiene, in no apparent distress, calm and cooperative, poor eye contact, no psychomotor agitation or retardation  Orientation: Alert and oriented to person, place and time  Recent and remote memory: Grossly intact  Attention span and concentration: Grossly intact  Speech: Spontaneous, normal rate, rhythm and tone  Thought Process: Linear, logical and goal directed  Thought Content: Denies suicidal or homicidal ideations, intent or plan  Perception: No delusions noted  Associations: Intact  Language: Appropriate  Fund of knowledge and vocabulary: Grossly adequate  Mood: \"relatively okay\"  Affect: Euthymic, mood congruent  Insight: Good  Judgment: Good    Depression screening:  Depression Screen (PHQ-2/PHQ-9) 10/4/2018 1/18/2019 2/19/2020   PHQ-2 Total Score 0 1 0   PHQ-9 Total Score - 5 -     Interpretation of PHQ-9 Total Score   Score Severity   1-4 No Depression   5-9 Mild Depression   10-14 Moderate Depression   15-19 Moderately Severe Depression   20-27 Severe Depression    Medical Records/Labs/Diagnostic Tests Reviewed:  [place] PDMP records - appropriate refills, no abuse suspected   Lipid profile normal, A1c normal (5/5/2022)      Impression:  1.  ADHD, combined type - improving   2.  Major depressive disorder, recurrent, in partial remission - stable  3.  Autism spectrum disorder - stable  4.  Chronic insomnia - stable    Plan:  1.  Continue Lexapro 10 mg daily for depression  2.  Continue Seroquel 200 mg at bedtime for depression augmentation and sleep.  -AIMS 0 (2/2020)              -Metabolic " monitoring (5/2022): lipid profile normal, A1c normal  -Repeat yearly metabolic monitoring labs due in 5/2023  3.  Continue Adderall IR 15 mg twice daily for ADHD.  E-prescribed x 3 months.  I have reminded her to be mindful of more than prescribed 2 tablets daily.    Return to clinic in 3 months or sooner if symptoms worsen    The proposed treatment plan was discussed with the patient who was provided the opportunity to ask questions and make suggestions regarding alternative treatment. Patient verbalized understanding and expressed agreement with the plan.     Haylee Champagne M.D.  07/18/22    This note was created using voice recognition software (Dragon). The accuracy of the dictation is limited by the abilities of the software. I have reviewed the note prior to signing, however some errors in grammar and context are still possible. If you have any questions related to this note please do not hesitate to contact our office.

## 2022-10-14 ENCOUNTER — TELEMEDICINE (OUTPATIENT)
Dept: BEHAVIORAL HEALTH | Facility: CLINIC | Age: 22
End: 2022-10-14
Payer: COMMERCIAL

## 2022-10-14 DIAGNOSIS — F84.0 AUTISM SPECTRUM DISORDER: ICD-10-CM

## 2022-10-14 DIAGNOSIS — F90.2 ADHD (ATTENTION DEFICIT HYPERACTIVITY DISORDER), COMBINED TYPE: ICD-10-CM

## 2022-10-14 DIAGNOSIS — F33.41 MDD (MAJOR DEPRESSIVE DISORDER), RECURRENT, IN PARTIAL REMISSION (HCC): ICD-10-CM

## 2022-10-14 DIAGNOSIS — F51.04 CHRONIC INSOMNIA: ICD-10-CM

## 2022-10-14 PROCEDURE — 99214 OFFICE O/P EST MOD 30 MIN: CPT | Mod: GT | Performed by: PSYCHIATRY & NEUROLOGY

## 2022-10-14 RX ORDER — DEXTROAMPHETAMINE SACCHARATE, AMPHETAMINE ASPARTATE, DEXTROAMPHETAMINE SULFATE AND AMPHETAMINE SULFATE 3.75; 3.75; 3.75; 3.75 MG/1; MG/1; MG/1; MG/1
15 TABLET ORAL 2 TIMES DAILY
Qty: 60 TABLET | Refills: 0 | Status: CANCELLED | OUTPATIENT
Start: 2022-12-13 | End: 2023-01-12

## 2022-10-14 RX ORDER — DEXTROAMPHETAMINE SACCHARATE, AMPHETAMINE ASPARTATE, DEXTROAMPHETAMINE SULFATE AND AMPHETAMINE SULFATE 3.75; 3.75; 3.75; 3.75 MG/1; MG/1; MG/1; MG/1
15 TABLET ORAL 2 TIMES DAILY
Qty: 60 TABLET | Refills: 0 | Status: CANCELLED | OUTPATIENT
Start: 2022-10-16 | End: 2022-11-15

## 2022-10-14 RX ORDER — DEXTROAMPHETAMINE SACCHARATE, AMPHETAMINE ASPARTATE, DEXTROAMPHETAMINE SULFATE AND AMPHETAMINE SULFATE 2.5; 2.5; 2.5; 2.5 MG/1; MG/1; MG/1; MG/1
10 TABLET ORAL 3 TIMES DAILY
Qty: 90 TABLET | Refills: 0 | Status: SHIPPED | OUTPATIENT
Start: 2022-12-11 | End: 2023-01-10

## 2022-10-14 RX ORDER — DEXTROAMPHETAMINE SACCHARATE, AMPHETAMINE ASPARTATE, DEXTROAMPHETAMINE SULFATE AND AMPHETAMINE SULFATE 2.5; 2.5; 2.5; 2.5 MG/1; MG/1; MG/1; MG/1
10 TABLET ORAL 3 TIMES DAILY
Qty: 90 TABLET | Refills: 0 | Status: SHIPPED | OUTPATIENT
Start: 2022-10-14 | End: 2022-11-13

## 2022-10-14 RX ORDER — QUETIAPINE FUMARATE 200 MG/1
200 TABLET, FILM COATED ORAL
Qty: 90 TABLET | Refills: 0 | Status: SHIPPED | OUTPATIENT
Start: 2022-10-14 | End: 2023-01-11 | Stop reason: SDUPTHER

## 2022-10-14 RX ORDER — ESCITALOPRAM OXALATE 10 MG/1
10 TABLET ORAL EVERY MORNING
Qty: 90 TABLET | Refills: 0 | Status: SHIPPED | OUTPATIENT
Start: 2022-10-14 | End: 2023-01-11 | Stop reason: SDUPTHER

## 2022-10-14 RX ORDER — DEXTROAMPHETAMINE SACCHARATE, AMPHETAMINE ASPARTATE, DEXTROAMPHETAMINE SULFATE AND AMPHETAMINE SULFATE 3.75; 3.75; 3.75; 3.75 MG/1; MG/1; MG/1; MG/1
15 TABLET ORAL 2 TIMES DAILY
Qty: 60 TABLET | Refills: 0 | Status: CANCELLED | OUTPATIENT
Start: 2022-11-14 | End: 2022-12-14

## 2022-10-14 RX ORDER — DEXTROAMPHETAMINE SACCHARATE, AMPHETAMINE ASPARTATE, DEXTROAMPHETAMINE SULFATE AND AMPHETAMINE SULFATE 2.5; 2.5; 2.5; 2.5 MG/1; MG/1; MG/1; MG/1
10 TABLET ORAL 3 TIMES DAILY
Qty: 90 TABLET | Refills: 0 | Status: SHIPPED | OUTPATIENT
Start: 2022-11-12 | End: 2022-12-12

## 2022-10-14 NOTE — PROGRESS NOTES
PSYCHIATRY VIRTUAL VISIT FOLLOW-UP NOTE      Chief Complaint   Patient presents with    Follow-Up     Depression, ADHD     This evaluation was conducted via Zoom using secure and encrypted videoconferencing technology.   The patient was in their home in the [facility].    The patient's identity was confirmed and verbal consent was obtained for this virtual visit.    History Of Present Illness:  Deni Vences is a 22 y.o. transgender female with autism spectrum disorder, ADHD, major depressive disorder comes in today for follow up, was last seen 3 months ago.  She has been doing all right in regards to her mental health.  She has had a lot of frustrations in terms of finding a job.  She is actively looking but has not had any luck.  She is also having some issues getting her financial aid to the point where she is thinking of dropping the classes.  She is having financial struggles with mom has been supportive.  She has been having longer days and is doing some side projects and is needing her focus and concentration for longer part of the day.  She has been doing good in regards to her sleep.  She denies having thoughts of wanting to hurt herself.    Social History:   She is single, identifies as bisexual, lives with mother in [facility], father and younger brother lives in [place], student at [employer], taking graphic [employer] classes, bachelor's degree in philosophy, unemployed but actively looking for a job.    Substance Use:  Alcohol - Denies   Nicotine - Denies   Cannabis - Denies  Illicit drugs - Denies    Past Medication Trials:  Prozac 20 mg, Lithium 450 mg, Trileptal 300 mg, Vyvanse 30 mg, Adderall XR 30 mg    Medications:  Current Outpatient Medications   Medication Sig Dispense Refill    amphetamine-dextroamphetamine (ADDERALL) 15 MG tablet Take 1 Tablet by mouth 2 times a day for 30 days. 60 Tablet 0    escitalopram (LEXAPRO) 10 MG Tab Take 1 Tablet by mouth every morning. 90 Tablet 0     "QUEtiapine (SEROQUEL) 200 MG Tab Take 1 Tablet by mouth at bedtime. 90 Tablet 0    progesterone (PROMETRIUM) 100 MG Cap       estradiol (ESTRACE) 2 MG Tab Take 2 mg by mouth every day.  3     No current facility-administered medications for this visit.     Review Of Systems:    Constitutional - Positive for fatigue  Psychiatric - Positive for infrequent depression    Physical Examination:  Vital signs: There were no vitals taken for this visit.    Musculoskeletal: No abnormal movements.     Mental Status Evaluation:   General: Young black female, dressed in casual attire, good grooming and hygiene, in no apparent distress, calm and cooperative, poor eye contact, no psychomotor agitation or retardation  Orientation: Alert and oriented to person, place and time  Recent and remote memory: Grossly intact  Attention span and concentration: Grossly intact  Speech: Spontaneous, normal rate, rhythm and tone  Thought Process: Linear, logical and goal directed  Thought Content: Denies suicidal or homicidal ideations, intent or plan  Perception: No delusions noted  Associations: Intact  Language: Appropriate  Fund of knowledge and vocabulary: Grossly adequate  Mood: \"okay\"  Affect: Euthymic, mood congruent  Insight: Good  Judgment: Good    Depression screening:  Depression Screen (PHQ-2/PHQ-9) 10/4/2018 1/18/2019 2/19/2020   PHQ-2 Total Score 0 1 0   PHQ-9 Total Score - 5 -     Interpretation of PHQ-9 Total Score   Score Severity   1-4 No Depression   5-9 Mild Depression   10-14 Moderate Depression   15-19 Moderately Severe Depression   20-27 Severe Depression    Medical Records/Labs/Diagnostic Tests Reviewed:  [place] PDMP records - appropriate refills, no abuse suspected       Impression:  1.  ADHD, combined type - stable   2.  Major depressive disorder, recurrent, in partial remission - stable  3.  Autism spectrum disorder - stable  4.  Chronic insomnia - stable    Plan:  1.  Continue Lexapro 10 mg daily for depression  2.  " Continue Seroquel 200 mg at bedtime for depression augmentation and sleep.  -AIMS 0 (2/2020)              -Metabolic monitoring (5/2022): lipid profile normal, A1c normal  -Repeat yearly metabolic monitoring labs due in 5/2023  3.  Continue Adderall IR but switch dosing to 10 mg three times daily for ADHD.  E-prescribed x 3 months.  I have advised him to be mindful of dosing to avoid struggles with insomnia.    Return to clinic in 3 months or sooner if symptoms worsen    The proposed treatment plan was discussed with the patient who was provided the opportunity to ask questions and make suggestions regarding alternative treatment. Patient verbalized understanding and expressed agreement with the plan.     Haylee Champagne M.D.  10/14/22    This note was created using voice recognition software (Dragon). The accuracy of the dictation is limited by the abilities of the software. I have reviewed the note prior to signing, however some errors in grammar and context are still possible. If you have any questions related to this note please do not hesitate to contact our office.

## 2023-01-10 ENCOUNTER — APPOINTMENT (OUTPATIENT)
Dept: BEHAVIORAL HEALTH | Facility: CLINIC | Age: 23
End: 2023-01-10
Payer: COMMERCIAL

## 2023-01-10 ENCOUNTER — PATIENT MESSAGE (OUTPATIENT)
Dept: BEHAVIORAL HEALTH | Facility: CLINIC | Age: 23
End: 2023-01-10
Payer: COMMERCIAL

## 2023-01-10 DIAGNOSIS — F51.04 CHRONIC INSOMNIA: ICD-10-CM

## 2023-01-10 DIAGNOSIS — F90.2 ADHD (ATTENTION DEFICIT HYPERACTIVITY DISORDER), COMBINED TYPE: ICD-10-CM

## 2023-01-10 DIAGNOSIS — F33.41 MDD (MAJOR DEPRESSIVE DISORDER), RECURRENT, IN PARTIAL REMISSION (HCC): ICD-10-CM

## 2023-01-11 RX ORDER — DEXTROAMPHETAMINE SACCHARATE, AMPHETAMINE ASPARTATE, DEXTROAMPHETAMINE SULFATE AND AMPHETAMINE SULFATE 2.5; 2.5; 2.5; 2.5 MG/1; MG/1; MG/1; MG/1
10 TABLET ORAL 3 TIMES DAILY
Qty: 90 TABLET | Refills: 0 | Status: SHIPPED | OUTPATIENT
Start: 2023-01-11 | End: 2023-02-10

## 2023-01-11 RX ORDER — ESCITALOPRAM OXALATE 10 MG/1
10 TABLET ORAL EVERY MORNING
Qty: 90 TABLET | Refills: 0 | Status: SHIPPED | OUTPATIENT
Start: 2023-01-11 | End: 2023-06-30 | Stop reason: SDUPTHER

## 2023-01-11 RX ORDER — QUETIAPINE FUMARATE 200 MG/1
200 TABLET, FILM COATED ORAL
Qty: 90 TABLET | Refills: 0 | Status: SHIPPED | OUTPATIENT
Start: 2023-01-11 | End: 2023-06-30 | Stop reason: SDUPTHER

## 2023-01-11 RX ORDER — DEXTROAMPHETAMINE SACCHARATE, AMPHETAMINE ASPARTATE, DEXTROAMPHETAMINE SULFATE AND AMPHETAMINE SULFATE 2.5; 2.5; 2.5; 2.5 MG/1; MG/1; MG/1; MG/1
10 TABLET ORAL 3 TIMES DAILY
Qty: 90 TABLET | Refills: 0 | Status: SHIPPED | OUTPATIENT
Start: 2023-02-09 | End: 2023-06-30 | Stop reason: SDUPTHER

## 2023-03-31 DIAGNOSIS — F90.2 ADHD (ATTENTION DEFICIT HYPERACTIVITY DISORDER), COMBINED TYPE: ICD-10-CM

## 2023-03-31 NOTE — TELEPHONE ENCOUNTER
Omid Urbina this is a [facility] pt who is unable to  RX b/c they did not have it in previous pharmacy is asking if we can resend to updated [facility] pharmacy      Received request via: Patient    Was the patient seen in the last year in this department? Yes    Does the patient have an active prescription (recently filled or refills available) for medication(s) requested? No    Does the patient have [facility] Plus and need 100 day supply (blood pressure, diabetes and cholesterol meds only)? Medication is not for cholesterol, blood pressure or diabetes

## 2023-04-03 RX ORDER — DEXTROAMPHETAMINE SACCHARATE, AMPHETAMINE ASPARTATE, DEXTROAMPHETAMINE SULFATE AND AMPHETAMINE SULFATE 2.5; 2.5; 2.5; 2.5 MG/1; MG/1; MG/1; MG/1
10 TABLET ORAL 3 TIMES DAILY
Qty: 90 TABLET | Refills: 0 | OUTPATIENT
Start: 2023-04-03 | End: 2023-05-03

## 2023-06-30 DIAGNOSIS — F33.41 MDD (MAJOR DEPRESSIVE DISORDER), RECURRENT, IN PARTIAL REMISSION (HCC): ICD-10-CM

## 2023-06-30 DIAGNOSIS — F90.2 ADHD (ATTENTION DEFICIT HYPERACTIVITY DISORDER), COMBINED TYPE: ICD-10-CM

## 2023-06-30 DIAGNOSIS — F51.04 CHRONIC INSOMNIA: ICD-10-CM

## 2023-06-30 RX ORDER — QUETIAPINE FUMARATE 200 MG/1
200 TABLET, FILM COATED ORAL
Qty: 90 TABLET | Refills: 0 | Status: SHIPPED | OUTPATIENT
Start: 2023-06-30

## 2023-06-30 RX ORDER — ESCITALOPRAM OXALATE 10 MG/1
10 TABLET ORAL EVERY MORNING
Qty: 90 TABLET | Refills: 0 | Status: SHIPPED | OUTPATIENT
Start: 2023-06-30

## 2023-06-30 RX ORDER — DEXTROAMPHETAMINE SACCHARATE, AMPHETAMINE ASPARTATE, DEXTROAMPHETAMINE SULFATE AND AMPHETAMINE SULFATE 2.5; 2.5; 2.5; 2.5 MG/1; MG/1; MG/1; MG/1
10 TABLET ORAL 3 TIMES DAILY
Qty: 90 TABLET | Refills: 0 | Status: SHIPPED | OUTPATIENT
Start: 2023-07-06 | End: 2023-08-05

## 2023-06-30 NOTE — TELEPHONE ENCOUNTER
Ihsan hartman. Pt cannot afford to be seen due to financial reasons. He is out of medication.      Received request via: Patient    Was the patient seen in the last year in this department? No    Does the patient have an active prescription (recently filled or refills available) for medication(s) requested? No    Does the patient have [facility] Plus and need 100 day supply (blood pressure, diabetes and cholesterol meds only)? Medication is not for cholesterol, blood pressure or diabetes and Patient does not have SCP